# Patient Record
Sex: MALE | Race: WHITE | NOT HISPANIC OR LATINO | Employment: FULL TIME | URBAN - METROPOLITAN AREA
[De-identification: names, ages, dates, MRNs, and addresses within clinical notes are randomized per-mention and may not be internally consistent; named-entity substitution may affect disease eponyms.]

---

## 2019-10-07 ENCOUNTER — OFFICE VISIT (OUTPATIENT)
Dept: INTERNAL MEDICINE | Facility: CLINIC | Age: 39
End: 2019-10-07

## 2019-10-07 VITALS
TEMPERATURE: 98.2 F | OXYGEN SATURATION: 98 % | HEIGHT: 69 IN | DIASTOLIC BLOOD PRESSURE: 76 MMHG | SYSTOLIC BLOOD PRESSURE: 122 MMHG | WEIGHT: 177 LBS | BODY MASS INDEX: 26.22 KG/M2 | HEART RATE: 90 BPM

## 2019-10-07 DIAGNOSIS — Z13.220 NEED FOR LIPID SCREENING: ICD-10-CM

## 2019-10-07 DIAGNOSIS — Z23 ENCOUNTER FOR IMMUNIZATION: ICD-10-CM

## 2019-10-07 DIAGNOSIS — Z30.09 ENCOUNTER FOR COUNSELING REGARDING CONTRACEPTION: ICD-10-CM

## 2019-10-07 DIAGNOSIS — K64.9 HEMORRHOIDS, UNSPECIFIED HEMORRHOID TYPE: ICD-10-CM

## 2019-10-07 DIAGNOSIS — Z23 INFLUENZA VACCINE NEEDED: ICD-10-CM

## 2019-10-07 DIAGNOSIS — F17.200 SMOKES TOBACCO DAILY: ICD-10-CM

## 2019-10-07 DIAGNOSIS — R53.83 FATIGUE, UNSPECIFIED TYPE: Primary | ICD-10-CM

## 2019-10-07 PROCEDURE — 99204 OFFICE O/P NEW MOD 45 MIN: CPT | Performed by: FAMILY MEDICINE

## 2019-10-07 PROCEDURE — 90472 IMMUNIZATION ADMIN EACH ADD: CPT | Performed by: FAMILY MEDICINE

## 2019-10-07 PROCEDURE — 90471 IMMUNIZATION ADMIN: CPT | Performed by: FAMILY MEDICINE

## 2019-10-07 PROCEDURE — 90686 IIV4 VACC NO PRSV 0.5 ML IM: CPT | Performed by: FAMILY MEDICINE

## 2019-10-07 PROCEDURE — 90732 PPSV23 VACC 2 YRS+ SUBQ/IM: CPT | Performed by: FAMILY MEDICINE

## 2019-10-07 NOTE — PROGRESS NOTES
"Date of Encounter: 10/07/2019  Patient: Mahin Lomas,  1980    History of Presenting Illness  Chief complaint: Fatigue, establish care    Pleasant 31-year-old male with daily tobacco use, hemorrhoids, fatigue, presents to establish care with the above complaints.    Fatigue: Present \"since my first child\", feels like he is unable to work that being extremely tired.  Works as a , when he starts his shift at 2 PM he is exhausted.  Because of his parental duties he often only sleeps 5 hours per night.  He has had to take his wife stimulants in order to stay awake at work sometimes.  He does endorse irritability and limited patience sometimes towards his children.    Hemorrhoids: 1 hemorrhoid has flared approximately 3 times over the past 2 years, possibly thrombosed 1 week ago but now resolving.  Intermittent constipation \"because I like to eat cheese\".  Does not drink a lot of water throughout the day.    Tobacco use: Smokes approximately 10 cigarettes/day, started as a social thing at work but now started to habit.  Pre-contemplative.  Endorses some intermittent cough, but no shortness of breath or wheezing.    Vasectomy: Interested in vasectomy.  Would like to talk to urologist.    , 2 children ages 10 and 2 years old, with one on the way due in January.  Does not exercise.  No alcohol use.  Tobacco as above.  History of cannabis use.  Eats a lot of cheese, meats (works at a [employer]).  Does not have a living well.    Has not had annual influenza, has not had PPSV23.    Review of Systems:  Negative for fever, congestion, chest pain upon exertion, shortness of breath, vision changes, vomiting, dysuria, lymphadenopathy, muscle weakness, focal numbness  Positive for constipation, eczema, irritability.    The following portions of the patient's history were reviewed and updated as appropriate: allergies, current medications, past family history, past medical history, past social history, past " "surgical history and problem list.    Patient Active Problem List   Diagnosis   • Fatigue   • Hemorrhoids   • Smokes tobacco daily     Past Medical History:   Diagnosis Date   • Ear drum perforation     REPAIRED VIA SX   • Hearing problem      Past Surgical History:   Procedure Laterality Date   • INNER EAR SURGERY Left      Family History   Problem Relation Age of Onset   • Heart disease Mother    • COPD Mother    • Heart disease Father    • Hypertension Father    • Diabetes Father    • Heart disease Maternal Grandmother    • Heart disease Paternal Grandfather    • Hypertension Paternal Grandfather    • Cancer Paternal Grandfather      No current outpatient medications on file.  No Known Allergies  Social History     Tobacco Use   • Smoking status: Current Every Day Smoker     Packs/day: 0.25     Start date: 1999   • Smokeless tobacco: Never Used   Substance Use Topics   • Alcohol use: No     Frequency: Never   • Drug use: Not on file       Physical Exam  Vitals:    10/07/19 0757   BP: 122/76   Pulse: 90   Temp: 98.2 °F (36.8 °C)   SpO2: 98%   Weight: 80.3 kg (177 lb)   Height: 175.3 cm (69\")       Pertinent findings:  Ear: Left tympanic membrane with scarring particularly at the anterior aspect near where prior reported patches were performed.  Cannot visualize any defects.    Other findings:  Constitutional: NAD.  Eyes: EOMI, PERRLA, normal conjunctiva.  Nose, mouth, throat: No tonsillar exudates or erythema. Mild postnasal drip.  Cardiovascular: RRR, no murmurs, no LE edema b/l, Pulses 2+ in UE b/l.  Respiratory: CTA b/l. Good effort.  Skin: No large lesions or wounds on face or upper extremities.  Lymphatic: No anterior cervical lymphadenopathy.  Endocrine: No thyromegaly or palpable thyroid nodules.  Psychiatric: Normal affect.  Reports mood is \"tired\".  Normal thought content.  Normal speech pattern.    Assessment and Plan  Diagnoses and all orders for this visit:    1. Need for lipid screening (Primary)  -   "   Lipid Panel    2. Fatigue, unspecified type: Likely due to lack of sleep, but this may not be modifiable. Exam unremarkable. Will get blood work to evaluate for other causes of fatigue.  -     CBC & Differential  -     Comprehensive Metabolic Panel  -     Vitamin B12  -     Thyroid Panel With TSH    3. Smokes tobacco daily: Discussed tobacco cessation.  Patient pre-contemplative.    4. Influenza vaccine needed  -     Fluarix/Fluzone/Afluria Quad/FluLaval Quad    5. Hemorrhoids, unspecified hemorrhoid type: If patient continues to have recurrence would recommend referral to colorectal for removal.    6. Encounter for counseling regarding contraception: Referral to urology to discuss vasectomy.    7. Encounter for immunization  -     Pneumococcal Polysaccharide Vaccine 23-Valent (PPSV23) Greater Than or Equal To 3yo Subcutaneous / IM    Will have patient return in 6 months for follow-up of fatigue, smoking.  Close watch on mood.  Covered most preventive topics today, but I would like to we discussed exercise and diet in more depth so we can also make that our annual visit.      Edward Oreilly MD  Family Medicine  O: [phone]  C: [phone]    Disclaimer: Parts of this note were dictated by speech recognition. Minor errors in transcription may be present. Please call if questions.

## 2019-10-09 LAB
ALBUMIN SERPL-MCNC: 5 G/DL (ref 3.5–5.5)
ALBUMIN/GLOB SERPL: 2.3 {RATIO} (ref 1.2–2.2)
ALP SERPL-CCNC: 62 IU/L (ref 39–117)
ALT SERPL-CCNC: 35 IU/L (ref 0–44)
AST SERPL-CCNC: 30 IU/L (ref 0–40)
BASOPHILS # BLD AUTO: 0 X10E3/UL (ref 0–0.2)
BASOPHILS NFR BLD AUTO: 1 %
BILIRUB SERPL-MCNC: 0.4 MG/DL (ref 0–1.2)
BUN SERPL-MCNC: 17 MG/DL (ref 6–20)
BUN/CREAT SERPL: 18 (ref 9–20)
CALCIUM SERPL-MCNC: 8.9 MG/DL (ref 8.7–10.2)
CHLORIDE SERPL-SCNC: 102 MMOL/L (ref 96–106)
CHOLEST SERPL-MCNC: 170 MG/DL (ref 100–199)
CO2 SERPL-SCNC: 23 MMOL/L (ref 20–29)
CREAT SERPL-MCNC: 0.97 MG/DL (ref 0.76–1.27)
EOSINOPHIL # BLD AUTO: 0.1 X10E3/UL (ref 0–0.4)
EOSINOPHIL NFR BLD AUTO: 2 %
ERYTHROCYTE [DISTWIDTH] IN BLOOD BY AUTOMATED COUNT: 13.6 % (ref 12.3–15.4)
FT4I SERPL CALC-MCNC: 1.6 (ref 1.2–4.9)
GLOBULIN SER CALC-MCNC: 2.2 G/DL (ref 1.5–4.5)
GLUCOSE SERPL-MCNC: 103 MG/DL (ref 65–99)
HCT VFR BLD AUTO: 42.1 % (ref 37.5–51)
HDLC SERPL-MCNC: 38 MG/DL
HGB BLD-MCNC: 13.6 G/DL (ref 13–17.7)
IMM GRANULOCYTES # BLD AUTO: 0 X10E3/UL (ref 0–0.1)
IMM GRANULOCYTES NFR BLD AUTO: 0 %
LDLC SERPL CALC-MCNC: 98 MG/DL (ref 0–99)
LYMPHOCYTES # BLD AUTO: 1.6 X10E3/UL (ref 0.7–3.1)
LYMPHOCYTES NFR BLD AUTO: 36 %
MCH RBC QN AUTO: 28.5 PG (ref 26.6–33)
MCHC RBC AUTO-ENTMCNC: 32.3 G/DL (ref 31.5–35.7)
MCV RBC AUTO: 88 FL (ref 79–97)
MONOCYTES # BLD AUTO: 0.4 X10E3/UL (ref 0.1–0.9)
MONOCYTES NFR BLD AUTO: 8 %
NEUTROPHILS # BLD AUTO: 2.4 X10E3/UL (ref 1.4–7)
NEUTROPHILS NFR BLD AUTO: 53 %
PLATELET # BLD AUTO: 274 X10E3/UL (ref 150–450)
POTASSIUM SERPL-SCNC: 4.5 MMOL/L (ref 3.5–5.2)
PROT SERPL-MCNC: 7.2 G/DL (ref 6–8.5)
RBC # BLD AUTO: 4.78 X10E6/UL (ref 4.14–5.8)
SODIUM SERPL-SCNC: 141 MMOL/L (ref 134–144)
T3RU NFR SERPL: 25 % (ref 24–39)
T4 SERPL-MCNC: 6.3 UG/DL (ref 4.5–12)
TRIGL SERPL-MCNC: 170 MG/DL (ref 0–149)
TSH SERPL DL<=0.005 MIU/L-ACNC: 2 UIU/ML (ref 0.45–4.5)
VIT B12 SERPL-MCNC: 636 PG/ML (ref 232–1245)
VLDLC SERPL CALC-MCNC: 34 MG/DL (ref 5–40)
WBC # BLD AUTO: 4.5 X10E3/UL (ref 3.4–10.8)

## 2019-10-11 ENCOUNTER — TELEPHONE (OUTPATIENT)
Dept: INTERNAL MEDICINE | Facility: CLINIC | Age: 39
End: 2019-10-11

## 2019-10-11 NOTE — TELEPHONE ENCOUNTER
After several attempts to call patient without success, I left a voice mail discussing the results as previously agreed. I have no concerns at this time.

## 2019-10-11 NOTE — TELEPHONE ENCOUNTER
After several attempts to call patient without success, I left a voice mail discussing the results as previously agreed.

## 2020-05-28 ENCOUNTER — OFFICE VISIT (OUTPATIENT)
Dept: INTERNAL MEDICINE | Facility: CLINIC | Age: 40
End: 2020-05-28

## 2020-05-28 VITALS
DIASTOLIC BLOOD PRESSURE: 62 MMHG | WEIGHT: 186.2 LBS | HEART RATE: 75 BPM | SYSTOLIC BLOOD PRESSURE: 118 MMHG | BODY MASS INDEX: 27.5 KG/M2 | OXYGEN SATURATION: 98 %

## 2020-05-28 DIAGNOSIS — F17.200 SMOKES TOBACCO DAILY: ICD-10-CM

## 2020-05-28 DIAGNOSIS — Z00.00 ANNUAL PHYSICAL EXAM: Primary | ICD-10-CM

## 2020-05-28 DIAGNOSIS — Z23 NEED FOR TDAP VACCINATION: ICD-10-CM

## 2020-05-28 DIAGNOSIS — R53.83 FATIGUE, UNSPECIFIED TYPE: ICD-10-CM

## 2020-05-28 PROBLEM — K64.9 HEMORRHOIDS: Status: RESOLVED | Noted: 2019-10-07 | Resolved: 2020-05-28

## 2020-05-28 PROCEDURE — 90471 IMMUNIZATION ADMIN: CPT | Performed by: FAMILY MEDICINE

## 2020-05-28 PROCEDURE — 99395 PREV VISIT EST AGE 18-39: CPT | Performed by: FAMILY MEDICINE

## 2020-05-28 PROCEDURE — 90715 TDAP VACCINE 7 YRS/> IM: CPT | Performed by: FAMILY MEDICINE

## 2020-05-28 NOTE — PROGRESS NOTES
Date of Encounter: 2020  Patient: Mahin Lomas,  1980    Subjective   History of Presenting Illness  Chief complaint: Follow-up fatigue, annual physical    Interval events: Patient had a third child, then due to COVID was laid off from work.  Fortunately things are going better at home and his work resumes the second week of .  He has a vasectomy scheduled for next week.    Fatigue: Still getting interrupted sleep due to 5-month-old waking up at night.  However, he feels his irritability, fatigue, and patience have improved significantly.  Denies guilt and anhedonia.    Still smoking 4 to 5 cigarettes/day.  Plans to quit when he returns to work.  Has been eating more junk food.  Plans to resume healthier diet once returned to work.  Exercising twice per week. Diet needs Tdap.    Review of Systems:  Here for cough, congestion, fever, abdominal pain, headaches    The following portions of the patient's history were reviewed and updated as appropriate: allergies, current medications, past family history, past medical history, past social history, past surgical history and problem list.    Patient Active Problem List   Diagnosis   • Fatigue   • Smokes tobacco daily     Past Medical History:   Diagnosis Date   • Ear drum perforation     REPAIRED VIA SX   • Hearing problem    • Hemorrhoids 10/7/2019     Past Surgical History:   Procedure Laterality Date   • INNER EAR SURGERY Left      Family History   Problem Relation Age of Onset   • Heart disease Mother    • COPD Mother    • Heart disease Father    • Hypertension Father    • Diabetes Father    • Heart disease Maternal Grandmother    • Heart disease Paternal Grandfather    • Hypertension Paternal Grandfather    • Cancer Paternal Grandfather      No current outpatient medications on file.  No Known Allergies  Social History     Tobacco Use   • Smoking status: Current Every Day Smoker     Packs/day: 0.25     Start date:    • Smokeless tobacco: Never  Used   Substance Use Topics   • Alcohol use: No     Frequency: Never   • Drug use: Not on file          Objective   Physical Exam  Vitals:    05/28/20 1014   BP: 118/62   Pulse: 75   SpO2: 98%   Weight: 84.5 kg (186 lb 3.2 oz)     Body mass index is 27.5 kg/m².    Constitutional: NAD.  Eyes: EOMI. PERRLA. Normal conjunctiva.  Ear, nose, mouth, throat: No tonsillar exudates or erythema.   Normal nasal mucosa. Normal external ear canals and TMs bilaterally.  Cardiovascular: RRR. No murmurs. No LE edema b/l. Radial pulses 2+ bilaterally.  Pulmonary: CTA b/l. Good effort.  Integumentary: No rashes or wounds on face or upper extremities.  Lymphatic: No anterior cervical lymphadenopathy.  Endocrine: No thyromegaly or palpable thyroid nodules.  Psychiatric: Normal affect. Normal thought content.     Assessment/Plan   Assessment and Plan  Pleasant 31-year-old male light tobacco smoker with mild to moderate fatigue who presents with the following:    Diagnoses and all orders for this visit:    1. Annual physical exam (Primary):We discussed preventative care including age and patient-appropriate immunizations and cancer screening. We also discussed the importance of exercise and a healthy diet. This is their annual preventative exam..  In particular we discussed Tdap vaccination, healthy diet and exercise, monitoring of mood.    2. Fatigue, unspecified type: Improved from prior exam, continue to monitor.  Discussed sleep training options for child to help get better sleep as I think this is the main  of his fatigue.  Labs have been unremarkable.    3. Smokes tobacco daily: Discussed smoking cessation strategies, he plans to quit cold turkey at resumption of work    4. Need for Tdap vaccination    Return to office in 1 year for annual physical or earlier as needed.    Edward Oreilly MD  Family Medicine  O: [phone]  C: [phone]    Disclaimer: Parts of this note were dictated by speech recognition. Minor errors in  transcription may be present. Please call if questions.

## 2021-05-19 ENCOUNTER — IMMUNIZATION (OUTPATIENT)
Dept: VACCINE CLINIC | Facility: HOSPITAL | Age: 41
End: 2021-05-19

## 2021-05-19 PROCEDURE — 0001A: CPT | Performed by: OBSTETRICS & GYNECOLOGY

## 2021-05-19 PROCEDURE — 91300 HC SARSCOV02 VAC 30MCG/0.3ML IM: CPT | Performed by: OBSTETRICS & GYNECOLOGY

## 2021-06-03 ENCOUNTER — OFFICE VISIT (OUTPATIENT)
Dept: INTERNAL MEDICINE | Facility: CLINIC | Age: 41
End: 2021-06-03

## 2021-06-03 VITALS
HEIGHT: 69 IN | SYSTOLIC BLOOD PRESSURE: 132 MMHG | WEIGHT: 189.8 LBS | DIASTOLIC BLOOD PRESSURE: 88 MMHG | OXYGEN SATURATION: 99 % | BODY MASS INDEX: 28.11 KG/M2 | HEART RATE: 123 BPM

## 2021-06-03 DIAGNOSIS — F17.200 SMOKES TOBACCO DAILY: ICD-10-CM

## 2021-06-03 DIAGNOSIS — Z00.00 ANNUAL PHYSICAL EXAM: Primary | ICD-10-CM

## 2021-06-03 DIAGNOSIS — L30.9 ECZEMA, UNSPECIFIED TYPE: ICD-10-CM

## 2021-06-03 DIAGNOSIS — E78.5 HYPERLIPIDEMIA, UNSPECIFIED HYPERLIPIDEMIA TYPE: ICD-10-CM

## 2021-06-03 DIAGNOSIS — S16.1XXA REPETITIVE STRAIN INJURY OF NECK, INITIAL ENCOUNTER: ICD-10-CM
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
## 2021-06-03 PROBLEM — R53.83 FATIGUE: Status: RESOLVED | Noted: 2019-10-07 | Resolved: 2021-06-03

## 2021-06-03 PROCEDURE — 99395 PREV VISIT EST AGE 18-39: CPT | Performed by: FAMILY MEDICINE

## 2021-06-03 RX ORDER — PREDNISONE 10 MG/1
TABLET ORAL
Qty: 28 TABLET | Refills: 0 | Status: SHIPPED | OUTPATIENT
Start: 2021-06-03 | End: 2021-06-15

## 2021-06-03 RX ORDER — TRIAMCINOLONE ACETONIDE 0.25 MG/G
CREAM TOPICAL 2 TIMES DAILY PRN
Qty: 80 G | Refills: 3 | Status: SHIPPED | OUTPATIENT
Start: 2021-06-03 | End: 2022-08-16

## 2021-06-03 NOTE — PROGRESS NOTES
Date of Encounter: 2021  Patient: Mahin Lomas,  1980    Subjective   History of Presenting Illness  Chief complaint: Annual physical    Left-sided neck strain: Has had for approximately 2 years off and on, most recent flare was treated successfully with a corticosteroid shot and physical therapy.  Usually gets worse when he is holding his daughter a lot because he holds her in the left arm.  Has not been able to do regular stretches or exercises for this.  No radicular symptoms.    Smoking 5 to 10 cigarettes/day, contemplative but not ready to set a quit date yet.    Eczema, control with topical triamcinolone cream, needs refill.    Fatigue has resolved with improved sleep.    , 3 children. Does not exercise. No alcohol use. Still smoking 4 to 5 cigarettes/day. History of cannabis use.  Improving diet by pseudointermittent fasting, reducing red meat consumption. 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  Does not have a living will. Pending second COVID19 vaccination.    Review of Systems:  Negative for fever, congestion, chest pain upon exertion, shortness of breath, vision changes, vomiting, dysuria, lymphadenopathy, muscle weakness, numbness, mood changes    Positive for eczema    The following portions of the patient's history were reviewed and updated as appropriate: allergies, current medications, past family history, past medical history, past social history, past surgical history and problem list.    Patient Active Problem List   Diagnosis   • Smokes tobacco daily   • Hyperlipidemia   • Eczema   • Repetitive strain injury of neck     Past Medical History:   Diagnosis Date   • Ear drum perforation     REPAIRED VIA SX   • Fatigue 10/7/2019   • Hearing problem    • Hemorrhoids 10/7/2019     Past Surgical History:   Procedure Laterality Date   • INNER EAR SURGERY Left      Family History   Problem Relation Age of Onset   • Heart disease Mother    • COPD Mother    • Heart disease Father    •  "Hypertension Father    • Diabetes Father    • Heart disease Maternal Grandmother    • Heart disease Paternal Grandfather    • Hypertension Paternal Grandfather    • Cancer Paternal Grandfather        Current Outpatient Medications:   •  predniSONE (DELTASONE) 10 MG tablet, Take 4 tablets by mouth Daily for 4 days, THEN 2 tablets Daily for 4 days, THEN 1 tablet Daily for 4 days., Disp: 28 tablet, Rfl: 0  •  triamcinolone (KENALOG) 0.025 % cream, Apply  topically to the appropriate area as directed 2 (Two) Times a Day As Needed (eczema)., Disp: 80 g, Rfl: 3  No Known Allergies  Social History     Tobacco Use   • Smoking status: Current Every Day Smoker     Packs/day: 0.25     Start date: 1999   • Smokeless tobacco: Never Used   Substance Use Topics   • Alcohol use: No   • Drug use: Not on file          Objective   Physical Exam  Vitals:    06/03/21 0926   BP: 132/88   Pulse: (!) 123   SpO2: 99%   Weight: 86.1 kg (189 lb 12.8 oz)   Height: 175.3 cm (69\")     Body mass index is 28.03 kg/m².    Constitutional: NAD.  Eyes: EOMI. PERRLA. Normal conjunctiva.  Ear, nose, mouth, throat: No tonsillar exudates or erythema.   Normal nasal mucosa. Normal external ear canals and TMs bilaterally.  Cardiovascular: RRR. No murmurs. No LE edema b/l. Radial pulses 2+ bilaterally.  Pulmonary: CTA b/l. Good effort.  Integumentary: No rashes or wounds on face or upper extremities.  Lymphatic: No anterior cervical lymphadenopathy.  Endocrine: No thyromegaly or palpable thyroid nodules.  Psychiatric: Normal affect. Normal thought content.  Gastrointestinal: Nondistended. No hepatosplenomegaly. No focal tenderness to palpation. Normal bowel sounds.  Musculoskeletal: Muscle tightness along the left neck and upper trapezius muscles.  No tenderness to palpation of the cervical spine.  No weakness in the upper extremities.  No palpable masses.     Assessment/Plan   Assessment and Plan  Pleasant 40-year-old male with hyperlipidemia, eczema, daily " tobacco use, who presents with the following:    Diagnoses and all orders for this visit:    1. Annual physical exam (Primary): We discussed preventative care including age and patient-appropriate immunizations and cancer screening. We also discussed the importance of exercise and a healthy diet. This is their annual preventative exam.  Goals are to quit smoking and start regular exercise at home, try to get good quantity sleep    2. Hyperlipidemia, unspecified hyperlipidemia type  -     Thyroid Panel With TSH  -     Lipid Panel  -     Comprehensive Metabolic Panel    3. Eczema, unspecified type: Controlled  -     triamcinolone (KENALOG) 0.025 % cream; Apply  topically to the appropriate area as directed 2 (Two) Times a Day As Needed (eczema).  Dispense: 80 g; Refill: 3    4. Repetitive strain injury of neck, initial encounter: Corticosteroids to reduce symptoms from acute injury since this is worked well for him previously, will also refer to physical therapy  -     predniSONE (DELTASONE) 10 MG tablet; Take 4 tablets by mouth Daily for 4 days, THEN 2 tablets Daily for 4 days, THEN 1 tablet Daily for 4 days.  Dispense: 28 tablet; Refill: 0  -     Ambulatory Referral to Physical Therapy Evaluate and treat    5. Smokes tobacco daily: He is to let me know when he is interested in quitting and we can discuss medications    Return to office in 1 year for annual physical or earlier as needed.    Edward Oreilly MD  Family Medicine  O: [phone]  C: [phone]    Disclaimer: Parts of this note were dictated by speech recognition. Minor errors in transcription may be present. Please call if questions.

## 2021-06-03 NOTE — PROGRESS NOTES
Venipuncture performed in [facility] by KD with good hemostasis. Patient tolerated well. 6/3/2021 Samara THEODORE LPN.

## 2021-06-04 LAB
ALBUMIN SERPL-MCNC: 4.9 G/DL (ref 4–5)
ALBUMIN/GLOB SERPL: 2.1 {RATIO} (ref 1.2–2.2)
ALP SERPL-CCNC: 64 IU/L (ref 48–121)
ALT SERPL-CCNC: 19 IU/L (ref 0–44)
AST SERPL-CCNC: 21 IU/L (ref 0–40)
BILIRUB SERPL-MCNC: 0.3 MG/DL (ref 0–1.2)
BUN SERPL-MCNC: 14 MG/DL (ref 6–20)
BUN/CREAT SERPL: 13 (ref 9–20)
CALCIUM SERPL-MCNC: 9.5 MG/DL (ref 8.7–10.2)
CHLORIDE SERPL-SCNC: 103 MMOL/L (ref 96–106)
CHOLEST SERPL-MCNC: 192 MG/DL (ref 100–199)
CO2 SERPL-SCNC: 25 MMOL/L (ref 20–29)
CREAT SERPL-MCNC: 1.12 MG/DL (ref 0.76–1.27)
FT4I SERPL CALC-MCNC: 1.6 (ref 1.2–4.9)
GLOBULIN SER CALC-MCNC: 2.3 G/DL (ref 1.5–4.5)
GLUCOSE SERPL-MCNC: 97 MG/DL (ref 65–99)
HDLC SERPL-MCNC: 46 MG/DL
LDLC SERPL CALC-MCNC: 128 MG/DL (ref 0–99)
POTASSIUM SERPL-SCNC: 4.8 MMOL/L (ref 3.5–5.2)
PROT SERPL-MCNC: 7.2 G/DL (ref 6–8.5)
SODIUM SERPL-SCNC: 141 MMOL/L (ref 134–144)
T3RU NFR SERPL: 26 % (ref 24–39)
T4 SERPL-MCNC: 6.3 UG/DL (ref 4.5–12)
TRIGL SERPL-MCNC: 98 MG/DL (ref 0–149)
TSH SERPL DL<=0.005 MIU/L-ACNC: 0.77 UIU/ML (ref 0.45–4.5)
VLDLC SERPL CALC-MCNC: 18 MG/DL (ref 5–40)

## 2021-06-09 ENCOUNTER — IMMUNIZATION (OUTPATIENT)
Dept: VACCINE CLINIC | Facility: HOSPITAL | Age: 41
End: 2021-06-09

## 2021-06-09 PROCEDURE — 0002A: CPT | Performed by: OBSTETRICS & GYNECOLOGY

## 2021-06-09 PROCEDURE — 91300 HC SARSCOV02 VAC 30MCG/0.3ML IM: CPT | Performed by: OBSTETRICS & GYNECOLOGY

## 2021-12-02 ENCOUNTER — OFFICE VISIT (OUTPATIENT)
Dept: INTERNAL MEDICINE | Facility: CLINIC | Age: 41
End: 2021-12-02

## 2021-12-02 VITALS
HEIGHT: 69 IN | HEART RATE: 86 BPM | DIASTOLIC BLOOD PRESSURE: 74 MMHG | TEMPERATURE: 98.2 F | BODY MASS INDEX: 26.13 KG/M2 | OXYGEN SATURATION: 99 % | SYSTOLIC BLOOD PRESSURE: 116 MMHG | WEIGHT: 176.4 LBS

## 2021-12-02 DIAGNOSIS — S69.91XA INJURY OF RING FINGER, RIGHT, INITIAL ENCOUNTER: ICD-10-CM

## 2021-12-02 DIAGNOSIS — B07.8 OTHER VIRAL WARTS: Primary | ICD-10-CM

## 2021-12-02 PROCEDURE — 99213 OFFICE O/P EST LOW 20 MIN: CPT | Performed by: FAMILY MEDICINE

## 2021-12-02 PROCEDURE — 17110 DESTRUCTION B9 LES UP TO 14: CPT | Performed by: FAMILY MEDICINE

## 2021-12-02 NOTE — PROGRESS NOTES
"Date of Encounter: 2021  Patient: Mahin Lomas,  1980    Subjective   History of Presenting Illness  Chief complaint: Wart    Wart that has been present for years on the medial aspect of his left second toe. Often causes irritation of the great toe due to friction from the hyperkeratosis. He has pared it down occasionally on his own but otherwise not using any particular wart treatment.    Recent \"jamming\" injury to the right ring finger, no longer has pain in general but he has noticed a deviation in the middle phalanx and occasional discomfort when gripping things with the fingertip and pulling.    Review of Systems:  Negative for fever, cough, shortness of breath    The following portions of the patient's history were reviewed and updated as appropriate: allergies, current medications, past family history, past medical history, past social history, past surgical history and problem list.    Patient Active Problem List   Diagnosis   • Smokes tobacco daily   • Hyperlipidemia   • Eczema   • Repetitive strain injury of neck     Past Medical History:   Diagnosis Date   • Ear drum perforation     REPAIRED VIA SX   • Fatigue 10/7/2019   • Hearing problem    • Hemorrhoids 10/7/2019     Past Surgical History:   Procedure Laterality Date   • INNER EAR SURGERY Left      Family History   Problem Relation Age of Onset   • Heart disease Mother    • COPD Mother    • Heart disease Father    • Hypertension Father    • Diabetes Father    • Heart disease Maternal Grandmother    • Heart disease Paternal Grandfather    • Hypertension Paternal Grandfather    • Cancer Paternal Grandfather        Current Outpatient Medications:   •  triamcinolone (KENALOG) 0.025 % cream, Apply  topically to the appropriate area as directed 2 (Two) Times a Day As Needed (eczema)., Disp: 80 g, Rfl: 3  No Known Allergies  Social History     Tobacco Use   • Smoking status: Current Every Day Smoker     Packs/day: 0.25     Start date:    • " "Smokeless tobacco: Never Used   Substance Use Topics   • Alcohol use: No   • Drug use: Not on file          Objective   Physical Exam  Vitals:    12/02/21 1052   BP: 116/74   Pulse: 86   Temp: 98.2 °F (36.8 °C)   TempSrc: Temporal   SpO2: 99%   Weight: 80 kg (176 lb 6.4 oz)   Height: 175.3 cm (69\")     Body mass index is 26.05 kg/m².    Constitutional: NAD.  Integumentary: 7 mm hyperkeratotic lesion with heaped outer edges and verrucous appearance of the central area. No ulceration.  Musculoskeletal: Radial deviation of the middle phalanx of the fourth digit of the right hand. Range of motion and strength is normal in the finger including at the DIP, PIP, and MCP. There is no tenderness to palpation.       Assessment/Plan   Assessment and Plan  Pleasant 41-year-old male with hyperlipidemia, eczema, daily tobacco use, who presents with the following:    Diagnoses and all orders for this visit:    1. Other viral warts (Primary): Discussed the risks and benefits of paring with cryotherapy. Using a #10 blade, after application of alcohol for antiseptic, remove the keratotic debris without any discomfort to the patient. There was no bleeding. After this I used liquid nitrogen applied by cotton swab for 4 cycles of frosting of the lesion. Discussed appropriate wound care and expectations. May need to return for additional treatment, also discussed over-the-counter salicylic acid treatment once healed.    2. Injury of ring finger, right, initial encounter: Examination consistent with prior fracture that appears to be completely healed. He does not have any significant pain or strength/range of motion limitations, since intervention would have to be surgical I only recommend consideration if he starts to have problems with this finger.    Edward Oreilly MD  Family Medicine  O: [phone]  C: [phone]    Disclaimer: Parts of this note were dictated by speech recognition. Minor errors in transcription may be present. " Please call if questions.

## 2022-03-11 ENCOUNTER — OFFICE VISIT (OUTPATIENT)
Dept: INTERNAL MEDICINE | Facility: CLINIC | Age: 42
End: 2022-03-11

## 2022-03-11 ENCOUNTER — PATIENT MESSAGE (OUTPATIENT)
Dept: INTERNAL MEDICINE | Facility: CLINIC | Age: 42
End: 2022-03-11

## 2022-03-11 VITALS
SYSTOLIC BLOOD PRESSURE: 118 MMHG | WEIGHT: 170 LBS | OXYGEN SATURATION: 99 % | BODY MASS INDEX: 25.18 KG/M2 | HEIGHT: 69 IN | DIASTOLIC BLOOD PRESSURE: 72 MMHG | TEMPERATURE: 98 F | HEART RATE: 95 BPM

## 2022-03-11 DIAGNOSIS — R09.81 NASAL CONGESTION: ICD-10-CM

## 2022-03-11 DIAGNOSIS — H65.91 RIGHT OTITIS MEDIA WITH EFFUSION: ICD-10-CM

## 2022-03-11 DIAGNOSIS — Z20.822 EXPOSURE TO COVID-19 VIRUS: Primary | ICD-10-CM

## 2022-03-11 DIAGNOSIS — F17.200 SMOKES TOBACCO DAILY: ICD-10-CM

## 2022-03-11 DIAGNOSIS — R05.9 COUGH: ICD-10-CM

## 2022-03-11 LAB
EXPIRATION DATE: NORMAL
FLUAV AG UPPER RESP QL IA.RAPID: NOT DETECTED
FLUBV AG UPPER RESP QL IA.RAPID: NOT DETECTED
INTERNAL CONTROL: NORMAL
Lab: NORMAL
SARS-COV-2 AG UPPER RESP QL IA.RAPID: NOT DETECTED

## 2022-03-11 PROCEDURE — 87428 SARSCOV & INF VIR A&B AG IA: CPT | Performed by: NURSE PRACTITIONER

## 2022-03-11 PROCEDURE — 99214 OFFICE O/P EST MOD 30 MIN: CPT | Performed by: NURSE PRACTITIONER

## 2022-03-11 RX ORDER — CEFDINIR 300 MG/1
300 CAPSULE ORAL 2 TIMES DAILY
Qty: 14 CAPSULE | Refills: 0 | Status: SHIPPED | OUTPATIENT
Start: 2022-03-11 | End: 2022-03-18

## 2022-03-11 NOTE — PROGRESS NOTES
Date of Encounter: 2022  Patient: Mahin Lomas,  1980    Subjective     Chief complaint: Exposure to Covid, cough and congestion.    HPI   Patient here today for sick visit.  Patient states his children and wife have been sick at home.  Patient has felt bad for the last 2 days.  Patient complains of cough, congestion and ear pain.  Patient states that he has a feeling of fullness in both ears.  Patient is continuing to smoke but states he has cut back some since feeling bad.  Patient has been taking Mucinex over-the-counter which she states has only helped some.  Patient denies any fever, nausea or vomiting.      Patient states he thought that it may have been allergies and started taking Claritin.  States that he has not noticed any difference with taking the Claritin.      Review of Systems:  Negative for fever, chest pain upon exertion, shortness of breath, vision changes, vomiting, dysuria, lymphadenopathy, muscle weakness, numbness, mood changes, rashes.  Positive for ear fullness, cough, congestion.    The following portions of the patient's history were reviewed and updated as appropriate: allergies, current medications, past family history, past medical history, past social history, past surgical history and problem list.    Patient Active Problem List   Diagnosis   • Smokes tobacco daily   • Hyperlipidemia   • Eczema   • Repetitive strain injury of neck     Past Medical History:   Diagnosis Date   • Ear drum perforation     REPAIRED VIA SX   • Fatigue 10/7/2019   • Hearing problem    • Hemorrhoids 10/7/2019     Past Surgical History:   Procedure Laterality Date   • INNER EAR SURGERY Left      Family History   Problem Relation Age of Onset   • Heart disease Mother    • COPD Mother    • Heart disease Father    • Hypertension Father    • Diabetes Father    • Heart disease Maternal Grandmother    • Heart disease Paternal Grandfather    • Hypertension Paternal Grandfather    • Cancer Paternal  "Grandfather        Current Outpatient Medications:   •  triamcinolone (KENALOG) 0.025 % cream, Apply  topically to the appropriate area as directed 2 (Two) Times a Day As Needed (eczema)., Disp: 80 g, Rfl: 3  •  cefdinir (OMNICEF) 300 MG capsule, Take 1 capsule by mouth 2 (Two) Times a Day for 7 days., Disp: 14 capsule, Rfl: 0  No Known Allergies  Social History     Tobacco Use   • Smoking status: Current Every Day Smoker     Packs/day: 0.25     Start date: 1999   • Smokeless tobacco: Never Used   Substance Use Topics   • Alcohol use: No          Objective   Physical Exam   Vitals:    03/11/22 1042   BP: 118/72   Pulse: 95   Temp: 98 °F (36.7 °C)   TempSrc: Oral   SpO2: 99%   Weight: 77.1 kg (170 lb)   Height: 175.3 cm (69\")     Body mass index is 25.1 kg/m².    Constitutional: NAD.  Eyes: EOMI. PERRLA. Normal conjunctiva.  Ear, nose, mouth, throat: No tonsillar exudates or erythema.   Normal nasal mucosa. Normal external ear canals left TM is red, right TM is red, bulging with effusion.  Cardiovascular: RRR. No murmurs.   Pulmonary: CTA b/l. Good effort.  Lymphatic: No anterior cervical lymphadenopathy.           Assessment/Plan   Assessment and Plan  Pleasant 41-year-old male with hyperlipidemia, eczema and others here today for sick visit.    Diagnoses and all orders for this visit:    1. Exposure to COVID-19 virus (Primary)  -     POCT SARS-CoV-2 Antigen CASEY + Flu    2. Nasal congestion  -     POCT SARS-CoV-2 Antigen CASEY + Flu    3. Cough  -     POCT SARS-CoV-2 Antigen CASEY + Flu    4. Right otitis media with effusion  -     cefdinir (OMNICEF) 300 MG capsule; Take 1 capsule by mouth 2 (Two) Times a Day for 7 days.  Dispense: 14 capsule; Refill: 0.  -Discussed about medications.  Answered some questions from the patient.    5. Smokes tobacco daily  Assessment & Plan:  -Encourage cessation.  Patient not ready to stop smoke at this time.         Patient verbalized understanding of and agreed to plan of " care.    Return if symptoms worsen or fail to improve.     Kallie Cunningham DNP, FNP-C  [facility]  O: [phone]      Please note that portions of this note were completed with a voice recognition program. Please call if questions.

## 2022-03-11 NOTE — TELEPHONE ENCOUNTER
From: Mahin Lomas  To: MARC Delaney  Sent: 3/11/2022 11:21 AM EST  Subject: Covid test     Hello, I was just in there for an ear infection and I got a Covid test. My employer needs a screenshot of the test if negative or positive.

## 2022-07-14 ENCOUNTER — TELEPHONE (OUTPATIENT)
Dept: INTERNAL MEDICINE | Facility: CLINIC | Age: 42
End: 2022-07-14

## 2022-07-14 NOTE — TELEPHONE ENCOUNTER
lvm for pt to call to schedule an office visit for restless legs before his physical in august.    Hub to read

## 2022-08-16 ENCOUNTER — OFFICE VISIT (OUTPATIENT)
Dept: INTERNAL MEDICINE | Facility: CLINIC | Age: 42
End: 2022-08-16

## 2022-08-16 VITALS
TEMPERATURE: 97.8 F | HEART RATE: 102 BPM | WEIGHT: 177.2 LBS | OXYGEN SATURATION: 100 % | BODY MASS INDEX: 26.17 KG/M2 | DIASTOLIC BLOOD PRESSURE: 86 MMHG | SYSTOLIC BLOOD PRESSURE: 124 MMHG

## 2022-08-16 DIAGNOSIS — F17.200 SMOKES TOBACCO DAILY: ICD-10-CM

## 2022-08-16 DIAGNOSIS — Z13.1 SCREENING FOR DIABETES MELLITUS: ICD-10-CM

## 2022-08-16 DIAGNOSIS — E78.5 HYPERLIPIDEMIA, UNSPECIFIED HYPERLIPIDEMIA TYPE: ICD-10-CM

## 2022-08-16 DIAGNOSIS — Z00.00 ANNUAL PHYSICAL EXAM: Primary | ICD-10-CM

## 2022-08-16 DIAGNOSIS — B07.8 OTHER VIRAL WARTS: ICD-10-CM

## 2022-08-16 DIAGNOSIS — L30.9 ECZEMA, UNSPECIFIED TYPE: ICD-10-CM

## 2022-08-16 DIAGNOSIS — Z13.89 SCREENING FOR BLOOD OR PROTEIN IN URINE: ICD-10-CM

## 2022-08-16 PROCEDURE — 99396 PREV VISIT EST AGE 40-64: CPT | Performed by: FAMILY MEDICINE

## 2022-08-16 RX ORDER — TRIAMCINOLONE ACETONIDE 1 MG/G
1 CREAM TOPICAL 2 TIMES DAILY
Qty: 80 G | Refills: 3 | Status: SHIPPED | OUTPATIENT
Start: 2022-08-16

## 2022-08-16 RX ORDER — MULTIPLE VITAMINS W/ MINERALS TAB 9MG-400MCG
1 TAB ORAL DAILY
COMMUNITY

## 2022-08-16 RX ORDER — IMIQUIMOD 12.5 MG/.25G
1 CREAM TOPICAL 2 TIMES WEEKLY
Qty: 24 EACH | Refills: 3 | Status: SHIPPED | OUTPATIENT
Start: 2022-08-18 | End: 2022-11-17

## 2022-08-16 NOTE — PROGRESS NOTES
Date of Encounter: 2022  Patient: Mahin Lomas,  1980    Subjective   History of Presenting Illness  Chief complaint: Annual physical    No acute concerns.    Viral warts of the foot, they recurred after cryotherapy treatment and have also spread to adjacent toes.  He is not using any topical therapy.    Eczema, does better with triamcinolone 0.1% instead of 0.025%.  He would like a refill for increased dose.    Left-sided neck strain has resolved with no current symptoms.    Pancreatic cancer in his maternal grandfather, paternal first cousin.  He does not know any further details about this pancreatic cancer but cousin is living and he can get these details.    , 3 children. Smoking 1/2 pack per day since age 17 yo, not currently interested in cessation.  Hopes to quit once his youngest kid enters school.  Does not use alcohol.  History of cannabis use.   Does not exercise.  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  Does not have a living will.   Discussed COVID-19 booster.    Review of Systems:  Negative for fever, congestion, chest pain upon exertion, shortness of breath, vision changes, vomiting, dysuria, lymphadenopathy, muscle weakness, numbness, mood changes, rashes.    The following portions of the patient's history were reviewed and updated as appropriate: allergies, current medications, past family history, past medical history, past social history, past surgical history and problem list.    Patient Active Problem List   Diagnosis   • Smokes tobacco daily   • Hyperlipidemia   • Eczema   • Repetitive strain injury of neck     Past Medical History:   Diagnosis Date   • Ear drum perforation     REPAIRED VIA SX   • Fatigue 10/7/2019   • Hearing problem    • Hemorrhoids 10/7/2019     Past Surgical History:   Procedure Laterality Date   • INNER EAR SURGERY Left      Family History   Problem  Relation Age of Onset   • Heart disease Mother    • COPD Mother    • Heart disease Father    • Hypertension Father    • Diabetes Father    • Heart disease Maternal Grandmother    • Pancreatic cancer Maternal Grandfather    • Heart disease Paternal Grandfather    • Hypertension Paternal Grandfather    • Pancreatic cancer Cousin        Current Outpatient Medications:   •  multivitamin with minerals tablet tablet, Take 1 tablet by mouth Daily., Disp: , Rfl:   •  [START ON 8/18/2022] imiquimod (Aldara) 5 % cream, Apply 1 application topically to the appropriate area as directed 2 (Two) Times a Week., Disp: 24 each, Rfl: 3  •  salicylic acid-lactic acid 17 % external solution, Apply  topically to the appropriate area as directed Daily., Disp: 14 mL, Rfl: 1  •  triamcinolone (KENALOG) 0.1 % cream, Apply 1 application topically to the appropriate area as directed 2 (Two) Times a Day., Disp: 80 g, Rfl: 3  No Known Allergies  Social History     Tobacco Use   • Smoking status: Current Every Day Smoker     Packs/day: 0.25     Start date: 1999   • Smokeless tobacco: Never Used   Substance Use Topics   • Alcohol use: No          Objective   Physical Exam  Vitals:    08/16/22 1235   BP: 124/86   Pulse: 102   Temp: 97.8 °F (36.6 °C)   TempSrc: Infrared   SpO2: 100%   Weight: 80.4 kg (177 lb 3.2 oz)     Body mass index is 26.17 kg/m².    Constitutional: NAD.  Eyes: EOMI. PERRLA. Normal conjunctiva.  Ear, nose, mouth, throat: No tonsillar exudates or erythema.   Normal nasal mucosa. Normal external ear canals and TMs bilaterally.  Cardiovascular: RRR. No murmurs. No LE edema b/l. Radial pulses 2+ bilaterally.  Pulmonary: CTA b/l. Good effort.  Integumentary: No rashes or wounds on face or upper extremities.  Lymphatic: No anterior cervical lymphadenopathy.  Endocrine: No thyromegaly or palpable thyroid nodules.  Psychiatric: Normal affect. Normal thought content.  Gastrointestinal: Nondistended. No hepatosplenomegaly. No focal  tenderness to palpation. Normal bowel sounds.       Assessment & Plan   Assessment and Plan  Pleasant 42-year-old male with hyperlipidemia, eczema, daily tobacco use, who presents with the following:    Diagnoses and all orders for this visit:    1. Annual physical exam (Primary): We discussed preventative care including age and patient-appropriate immunizations and cancer screening. We also discussed the importance of exercise and a healthy diet. This is their annual preventative exam.    2. Other viral warts  -     imiquimod (Aldara) 5 % cream; Apply 1 application topically to the appropriate area as directed 2 (Two) Times a Week.  Dispense: 24 each; Refill: 3  -     salicylic acid-lactic acid 17 % external solution; Apply  topically to the appropriate area as directed Daily.  Dispense: 14 mL; Refill: 1    3. Eczema, unspecified type  -     triamcinolone (KENALOG) 0.1 % cream; Apply 1 application topically to the appropriate area as directed 2 (Two) Times a Day.  Dispense: 80 g; Refill: 3    4. Hyperlipidemia, unspecified hyperlipidemia type:   The 10-year ASCVD risk score ([place] PONCHO Jr., et al., 2013) is: 3.9%    Values used to calculate the score:      Age: 42 years      Sex: Male      Is Non- : No      Diabetic: No      Tobacco smoker: Yes      Systolic Blood Pressure: 124 mmHg      Is BP treated: No      HDL Cholesterol: 46 mg/dL      Total Cholesterol: 192 mg/dL  -     Comprehensive Metabolic Panel  -     CBC & Differential  -     Lipid Panel    5. Smokes tobacco daily: Discussed smoking cessation which he is precontemplative about at this time.    6. Screening for blood or protein in urine  -     Urinalysis With Microscopic - Urine, Clean Catch    7. Screening for diabetes mellitus  -     Hemoglobin A1c    Return to office in 1 year for annual physical or earlier as needed.    Edward Oreilly MD  Family Medicine  O: [phone]  C: [phone]    Disclaimer: Parts of this note were  dictated by speech recognition. Minor errors in transcription may be present. Please call if questions.

## 2022-08-16 NOTE — PATIENT INSTRUCTIONS
Have your cousin ask his oncologist:  1. Should any of my family members get screened for pancreatic cancer?  2. Are there any genetic problems that my family needs to know about that are related to my pancreatic cancer?

## 2022-08-17 PROBLEM — R73.03 PREDIABETES: Status: ACTIVE | Noted: 2022-08-17

## 2022-08-17 LAB
ALBUMIN SERPL-MCNC: 4.6 G/DL (ref 4–5)
ALBUMIN/GLOB SERPL: 2.4 {RATIO} (ref 1.2–2.2)
ALP SERPL-CCNC: 58 IU/L (ref 44–121)
ALT SERPL-CCNC: 15 IU/L (ref 0–44)
APPEARANCE UR: CLEAR
AST SERPL-CCNC: 14 IU/L (ref 0–40)
BACTERIA #/AREA URNS HPF: NORMAL /[HPF]
BASOPHILS # BLD AUTO: 0.1 X10E3/UL (ref 0–0.2)
BASOPHILS NFR BLD AUTO: 1 %
BILIRUB SERPL-MCNC: <0.2 MG/DL (ref 0–1.2)
BILIRUB UR QL STRIP: NEGATIVE
BUN SERPL-MCNC: 12 MG/DL (ref 6–24)
BUN/CREAT SERPL: 13 (ref 9–20)
CALCIUM SERPL-MCNC: 9.3 MG/DL (ref 8.7–10.2)
CASTS URNS QL MICRO: NORMAL /LPF
CHLORIDE SERPL-SCNC: 107 MMOL/L (ref 96–106)
CHOLEST SERPL-MCNC: 183 MG/DL (ref 100–199)
CO2 SERPL-SCNC: 24 MMOL/L (ref 20–29)
COLOR UR: YELLOW
CREAT SERPL-MCNC: 0.95 MG/DL (ref 0.76–1.27)
EGFRCR-CYS SERPLBLD CKD-EPI 2021: 103 ML/MIN/1.73
EOSINOPHIL # BLD AUTO: 0.1 X10E3/UL (ref 0–0.4)
EOSINOPHIL NFR BLD AUTO: 3 %
EPI CELLS #/AREA URNS HPF: NORMAL /HPF (ref 0–10)
ERYTHROCYTE [DISTWIDTH] IN BLOOD BY AUTOMATED COUNT: 13.6 % (ref 11.6–15.4)
GLOBULIN SER CALC-MCNC: 1.9 G/DL (ref 1.5–4.5)
GLUCOSE SERPL-MCNC: 79 MG/DL (ref 65–99)
GLUCOSE UR QL STRIP: NEGATIVE
HBA1C MFR BLD: 5.9 % (ref 4.8–5.6)
HCT VFR BLD AUTO: 39.7 % (ref 37.5–51)
HDLC SERPL-MCNC: 42 MG/DL
HGB BLD-MCNC: 12.9 G/DL (ref 13–17.7)
HGB UR QL STRIP: NEGATIVE
IMM GRANULOCYTES # BLD AUTO: 0 X10E3/UL (ref 0–0.1)
IMM GRANULOCYTES NFR BLD AUTO: 0 %
KETONES UR QL STRIP: NEGATIVE
LDLC SERPL CALC-MCNC: 116 MG/DL (ref 0–99)
LEUKOCYTE ESTERASE UR QL STRIP: NEGATIVE
LYMPHOCYTES # BLD AUTO: 1.3 X10E3/UL (ref 0.7–3.1)
LYMPHOCYTES NFR BLD AUTO: 25 %
MCH RBC QN AUTO: 27.9 PG (ref 26.6–33)
MCHC RBC AUTO-ENTMCNC: 32.5 G/DL (ref 31.5–35.7)
MCV RBC AUTO: 86 FL (ref 79–97)
MICRO URNS: NORMAL
MICRO URNS: NORMAL
MONOCYTES # BLD AUTO: 0.3 X10E3/UL (ref 0.1–0.9)
MONOCYTES NFR BLD AUTO: 7 %
NEUTROPHILS # BLD AUTO: 3.2 X10E3/UL (ref 1.4–7)
NEUTROPHILS NFR BLD AUTO: 64 %
NITRITE UR QL STRIP: NEGATIVE
PH UR STRIP: 6.5 [PH] (ref 5–7.5)
PLATELET # BLD AUTO: 222 X10E3/UL (ref 150–450)
POTASSIUM SERPL-SCNC: 4 MMOL/L (ref 3.5–5.2)
PROT SERPL-MCNC: 6.5 G/DL (ref 6–8.5)
PROT UR QL STRIP: NEGATIVE
RBC # BLD AUTO: 4.63 X10E6/UL (ref 4.14–5.8)
RBC #/AREA URNS HPF: NORMAL /HPF (ref 0–2)
SODIUM SERPL-SCNC: 143 MMOL/L (ref 134–144)
SP GR UR STRIP: 1.02 (ref 1–1.03)
TRIGL SERPL-MCNC: 137 MG/DL (ref 0–149)
UROBILINOGEN UR STRIP-MCNC: 0.2 MG/DL (ref 0.2–1)
VLDLC SERPL CALC-MCNC: 25 MG/DL (ref 5–40)
WBC # BLD AUTO: 5 X10E3/UL (ref 3.4–10.8)
WBC #/AREA URNS HPF: NORMAL /HPF (ref 0–5)

## 2022-08-17 NOTE — PROGRESS NOTES
The main finding in your blood work is that you are considered prediabetic.  Please be mindful of sugars and carbohydrates in your diet.  Regular exercise and a 10 pound weight loss would likely resolve this problem completely.  I do recommend making an appointment to see me in about 6 months to recheck this.  If it does not improve we may consider medications.    There are some minor nonsignificant abnormalities in the rest of your blood work that I am not concerned about.

## 2022-09-18 ENCOUNTER — TELEPHONE (OUTPATIENT)
Dept: FAMILY MEDICINE CLINIC | Facility: CLINIC | Age: 42
End: 2022-09-18

## 2022-09-18 DIAGNOSIS — H65.193 OTHER NON-RECURRENT ACUTE NONSUPPURATIVE OTITIS MEDIA OF BOTH EARS: Primary | ICD-10-CM

## 2022-09-18 RX ORDER — AMOXICILLIN 875 MG/1
875 TABLET, COATED ORAL 2 TIMES DAILY
Qty: 20 TABLET | Refills: 0 | Status: SHIPPED | OUTPATIENT
Start: 2022-09-18 | End: 2022-09-28

## 2022-09-18 NOTE — TELEPHONE ENCOUNTER
I spoke with Mahin on 9/18/2022 at 1020 am.  Woke up this morning around 4 AM with severe pain.  States he was outside yesterday doing yard work and around a bonfire.  States he has had ear infections in the past.  Describes the pain as a pressure feeling.  Denied any headache, fevers, chills or rhinorrhea.    I will send to pharmacy amoxicillin to take twice daily.  He was instructed if symptoms do not improve, he will schedule an office appointment with Dr. Oreilly.

## 2022-10-03 ENCOUNTER — OFFICE VISIT (OUTPATIENT)
Dept: INTERNAL MEDICINE | Facility: CLINIC | Age: 42
End: 2022-10-03

## 2022-10-03 VITALS
DIASTOLIC BLOOD PRESSURE: 96 MMHG | WEIGHT: 174 LBS | SYSTOLIC BLOOD PRESSURE: 134 MMHG | TEMPERATURE: 98.6 F | BODY MASS INDEX: 25.77 KG/M2 | OXYGEN SATURATION: 97 % | HEIGHT: 69 IN | HEART RATE: 75 BPM

## 2022-10-03 DIAGNOSIS — R55 VASOVAGAL SYNCOPE: Primary | ICD-10-CM

## 2022-10-03 DIAGNOSIS — E78.5 HYPERLIPIDEMIA, UNSPECIFIED HYPERLIPIDEMIA TYPE: ICD-10-CM

## 2022-10-03 DIAGNOSIS — R73.03 PREDIABETES: ICD-10-CM

## 2022-10-03 PROCEDURE — 99214 OFFICE O/P EST MOD 30 MIN: CPT | Performed by: NURSE PRACTITIONER

## 2022-10-03 NOTE — PROGRESS NOTES
Date of Encounter: 10/03/2022  Patient: Mahin Lomas,  1980    Subjective     Chief complaint: Syncope    HPI   Patient here today after syncope at home last night.  Patient was evaluated by EMS and states that his blood sugar and EKG were normal.  Patient has his wife here today and they have some questions about best next steps in care.    Patient states that just before passing out he was feeling like an upset stomach.  Patient states he was constipated and trying to have a bowel movement.  Patient states that he ended up having a bowel movement later that evening that was followed by some diarrhea.  Patient states he then felt much better.  Patient states that he is gone about his day today like normal.  Feels fine.      Review of Systems:  Negative for fever, congestion, chest pain upon exertion, shortness of breath, vision changes, vomiting, dysuria, lymphadenopathy, muscle weakness, numbness, mood changes, rashes.    The following portions of the patient's history were reviewed and updated as appropriate: allergies, current medications, past family history, past medical history, past social history, past surgical history and problem list.    Patient Active Problem List   Diagnosis   • Smokes tobacco daily   • Hyperlipidemia   • Eczema   • Repetitive strain injury of neck   • Prediabetes     Past Medical History:   Diagnosis Date   • Ear drum perforation     REPAIRED VIA SX   • Fatigue 10/7/2019   • Hearing problem    • Hemorrhoids 10/7/2019     Past Surgical History:   Procedure Laterality Date   • INNER EAR SURGERY Left      Family History   Problem Relation Age of Onset   • Heart disease Mother    • COPD Mother    • Heart disease Father    • Hypertension Father    • Diabetes Father    • Heart disease Maternal Grandmother    • Pancreatic cancer Maternal Grandfather    • Heart disease Paternal Grandfather    • Hypertension Paternal Grandfather    • Pancreatic cancer Cousin        Current Outpatient  "Medications:   •  imiquimod (Aldara) 5 % cream, Apply 1 application topically to the appropriate area as directed 2 (Two) Times a Week., Disp: 24 each, Rfl: 3  •  multivitamin with minerals tablet tablet, Take 1 tablet by mouth Daily., Disp: , Rfl:   •  salicylic acid-lactic acid 17 % external solution, Apply  topically to the appropriate area as directed Daily., Disp: 14 mL, Rfl: 1  •  triamcinolone (KENALOG) 0.1 % cream, Apply 1 application topically to the appropriate area as directed 2 (Two) Times a Day., Disp: 80 g, Rfl: 3  No Known Allergies  Social History     Tobacco Use   • Smoking status: Current Every Day Smoker     Packs/day: 0.25     Start date: 1999   • Smokeless tobacco: Never Used   Substance Use Topics   • Alcohol use: No          Objective   Physical Exam   Vitals:    10/03/22 1338   BP: 134/96   Pulse: 75   Temp: 98.6 °F (37 °C)   TempSrc: Temporal   SpO2: 97%   Weight: 78.9 kg (174 lb)   Height: 175.3 cm (69\")     Body mass index is 25.7 kg/m².    Constitutional: NAD.  Eyes: EOMI. PERRLA. Normal conjunctiva.  Cardiovascular: RRR. No murmurs. No LE edema b/l. Radial pulses 2+ bilaterally.  Pulmonary: CTA b/l. Good effort.  Integumentary: No rashes or wounds on face or upper extremities.  GI: Abdomen soft and flat  Psychiatric: Normal affect. Normal thought content.           Assessment & Plan   Assessment and Plan  Pleasant 42-year-old male with hyperlipidemia, prediabetes, eczema and others here today to discuss the following:    Diagnoses and all orders for this visit:    1. Vasovagal syncope (Primary)   Reviewed history and discussed event.  Answered some questions from the patient and his wife.  Discussed about constipation prevention.    2. Hyperlipidemia, unspecified hyperlipidemia type  Assessment & Plan:  Reviewed previous labs with patient and wife at bedside.  Discussed about diet as well as behavior modifications.      3. Prediabetes  Assessment & Plan:  Reviewed previous labs with " patient and wife at bedside.  Discussed about diet and exercise.  Recommended patient make a goal to eliminate soda pop from his diet.  Gave patient educational handout attached to AVS.         Answered many questions from wife and patient regarding overall health and best next steps in care.  Patient to make behavioral modifications and diet and exercise routine.  Patient verbalized understanding of and agreed to plan of care.  Return in about 6 weeks (around 11/14/2022) for Fasting Labs, Follow Up.     Kallie Cunningham DNP, FNP-C  [facility]  O: [phone]      Please note that portions of this note were completed with a voice recognition program. Please call if questions.

## 2022-10-03 NOTE — ASSESSMENT & PLAN NOTE
Reviewed previous labs with patient and wife at bedside.  Discussed about diet and exercise.  Recommended patient make a goal to eliminate soda pop from his diet.  Gave patient educational handout attached to AVS.

## 2022-10-03 NOTE — ASSESSMENT & PLAN NOTE
Reviewed previous labs with patient and wife at bedside.  Discussed about diet as well as behavior modifications.

## 2022-10-03 NOTE — PATIENT INSTRUCTIONS
Prediabetes Eating Plan  Prediabetes is a condition that causes blood sugar (glucose) levels to be higher than normal. This increases the risk for developing type 2 diabetes (type 2 diabetes mellitus). Working with a health care provider or nutrition specialist (dietitian) to make diet and lifestyle changes can help prevent the onset of diabetes. These changes may help you:  Control your blood glucose levels.  Improve your cholesterol levels.  Manage your blood pressure.  What are tips for following this plan?  Reading food labels  Read food labels to check the amount of fat, salt (sodium), and sugar in prepackaged foods. Avoid foods that have:  Saturated fats.  Trans fats.  Added sugars.  Avoid foods that have more than 300 milligrams (mg) of sodium per serving. Limit your sodium intake to less than 2,300 mg each day.  Shopping  Avoid buying pre-made and processed foods.  Avoid buying drinks with added sugar.  Cooking  Cook with olive oil. Do not use butter, lard, or ghee.  Bake, broil, grill, steam, or boil foods. Avoid frying.  Meal planning    Work with your dietitian to create an eating plan that is right for you. This may include tracking how many calories you take in each day. Use a food diary, notebook, or mobile application to track what you eat at each meal.  Consider following a Mediterranean diet. This includes:  Eating several servings of fresh fruits and vegetables each day.  Eating fish at least twice a week.  Eating one serving each day of whole grains, beans, nuts, and seeds.  Using olive oil instead of other fats.  Limiting alcohol.  Limiting red meat.  Using nonfat or low-fat dairy products.  Consider following a plant-based diet. This includes dietary choices that focus on eating mostly vegetables and fruit, grains, beans, nuts, and seeds.  If you have high blood pressure, you may need to limit your sodium intake or follow a diet such as the DASH (Dietary Approaches to Stop Hypertension) eating  plan. The DASH diet aims to lower high blood pressure.  Lifestyle  Set weight loss goals with help from your health care team. It is recommended that most people with prediabetes lose 7% of their body weight.  Exercise for at least 30 minutes 5 or more days a week.  Attend a support group or seek support from a mental health counselor.  Take over-the-counter and prescription medicines only as told by your health care provider.  What foods are recommended?  Fruits  Berries. Bananas. Apples. Oranges. Grapes. Papaya. [place]. Pomegranate. Kiwi. Grapefruit. Cherries.  Vegetables  Lettuce. Spinach. Peas. Beets. Cauliflower. Cabbage. Broccoli. Carrots. Tomatoes. Squash. Eggplant. Herbs. Peppers. Onions. Cucumbers. [place] sprouts.  Grains  Whole grains, such as whole-wheat or whole-grain breads, crackers, cereals, and pasta. Unsweetened oatmeal. Bulgur. Barley. Quinoa. Brown rice. Corn or whole-wheat flour tortillas or taco shells.  Meats and other proteins  Seafood. Poultry without skin. Lean cuts of pork and beef. Tofu. Eggs. Nuts. Beans.  Dairy  Low-fat or fat-free dairy products, such as yogurt, cottage cheese, and cheese.  Beverages  Water. Tea. Coffee. Sugar-free or diet soda. [place] water. Low-fat or nonfat milk. Milk alternatives, such as soy or almond milk.  Fats and oils  Olive oil. Canola oil. Sunflower oil. Grapeseed oil. Avocado. Walnuts.  Sweets and desserts  Sugar-free or low-fat pudding. Sugar-free or low-fat ice cream and other frozen treats.  Seasonings and condiments  Herbs. Sodium-free spices. Mustard. Relish. Low-salt, low-sugar ketchup. Low-salt, low-sugar barbecue sauce. Low-fat or fat-free mayonnaise.  The items listed above may not be a complete list of recommended foods and beverages. Contact a dietitian for more information.  What foods are not recommended?  Fruits  Fruits canned with syrup.  Vegetables  Canned vegetables. Frozen vegetables with butter or cream sauce.  Grains  Refined white  flour and flour products, such as bread, pasta, snack foods, and cereals.  Meats and other proteins  Fatty cuts of meat. Poultry with skin. Breaded or fried meat. Processed meats.  Dairy  Full-fat yogurt, cheese, or milk.  Beverages  Sweetened drinks, such as iced tea and soda.  Fats and oils  Butter. Lard. Ghee.  Sweets and desserts  Baked goods, such as cake, cupcakes, pastries, cookies, and cheesecake.  Seasonings and condiments  Spice mixes with added salt. Ketchup. Barbecue sauce. Mayonnaise.  The items listed above may not be a complete list of foods and beverages that are not recommended. Contact a dietitian for more information.  Where to find more information  American Diabetes Association: www.diabetes.org  Summary  You may need to make diet and lifestyle changes to help prevent the onset of diabetes. These changes can help you control blood sugar, improve cholesterol levels, and manage blood pressure.  Set weight loss goals with help from your health care team. It is recommended that most people with prediabetes lose 7% of their body weight.  Consider following a Mediterranean diet. This includes eating plenty of fresh fruits and vegetables, whole grains, beans, nuts, seeds, fish, and low-fat dairy, and using olive oil instead of other fats.  This information is not intended to replace advice given to you by your health care provider. Make sure you discuss any questions you have with your health care provider.  Document Revised: 03/18/2021 Document Reviewed: 03/18/2021  Elsevier Patient Education © 2022 Elsevier Inc.  Cholesterol Content in Foods  Cholesterol is a waxy, fat-like substance that helps to carry fat in the blood. The body needs cholesterol in small amounts, but too much cholesterol can cause damage to the arteries and heart.  What foods have cholesterol?  Cholesterol is found in animal-based foods, such as meat, seafood, and dairy. Generally, low-fat dairy and lean meats have less cholesterol  than full-fat dairy and fatty meats. The milligrams of cholesterol per serving (mg per serving) of common cholesterol-containing foods are listed below.  Meats and other proteins  Egg -- one large whole egg has 186 mg.  Veal shank -- 4 oz (113 g) has 141 mg.  Lean ground turkey (93% lean) -- 4 oz (113 g) has 118 mg.  Fat-trimmed lamb loin -- 4 oz (113 g) has 106 mg.  Lean ground beef (90% lean) -- 4 oz (113 g) has 100 mg.  Lobster -- 3.5 oz (99 g) has 90 mg.  Pork loin chops -- 4 oz (113 g) has 86 mg.  Canned salmon -- 3.5 oz (99 g) has 83 mg.  Fat-trimmed beef top loin -- 4 oz (113 g) has 78 mg.  Frankfurter -- 1 yesenia (3.5 oz or 99 g) has 77 mg.  Crab -- 3.5 oz (99 g) has 71 mg.  Roasted chicken without skin, white meat -- 4 oz (113 g) has 66 mg.  Light bologna -- 2 oz (57 g) has 45 mg.  Deli-cut turkey -- 2 oz (57 g) has 31 mg.  Canned tuna -- 3.5 oz (99 g) has 31 mg.  [place] -- 1 oz (28 g) has 29 mg.  Oysters and mussels (raw) -- 3.5 oz (99 g) has 25 mg.  Mackerel -- 1 oz (28 g) has 22 mg.  Trout -- 1 oz (28 g) has 20 mg.  Pork sausage -- 1 link (1 oz or 28 g) has 17 mg.  [place] -- 1 oz (28 g) has 16 mg.  Tilapia -- 1 oz (28 g) has 14 mg.  Dairy  Soft-serve ice cream -- ½ cup (4 oz or 86 g) has 103 mg.  Whole-milk yogurt -- 1 cup (8 oz or 245 g) has 29 mg.  Cheddar cheese -- 1 oz (28 g) has 28 mg.  American cheese -- 1 oz (28 g) has 28 mg.  Whole milk -- 1 cup (8 oz or 250 mL) has 23 mg.  2% milk -- 1 cup (8 oz or 250 mL) has 18 mg.  Cream cheese -- 1 tablespoon (Tbsp) (14.5 g) has 15 mg.  Cottage cheese -- ½ cup (4 oz or 113 g) has 14 mg.  Low-fat (1%) milk -- 1 cup (8 oz or 250 mL) has 10 mg.  Sour cream -- 1 Tbsp (12 g) has 8.5 mg.  Low-fat yogurt -- 1 cup (8 oz or 245 g) has 8 mg.  Nonfat Greek yogurt -- 1 cup (8 oz or 228 g) has 7 mg.  Half-and-half cream -- 1 Tbsp (15 mL) has 5 mg.  Fats and oils  Cod liver oil -- 1 tablespoon (Tbsp) (13.6 g) has 82 mg.  Butter -- 1 Tbsp (14 g) has 15 mg.  Lard -- 1 Tbsp  (12.8 g) has 14 mg.  [place] grease -- 1 Tbsp (12.9 g) has 14 mg.  Mayonnaise -- 1 Tbsp (13.8 g) has 5-10 mg.  Margarine -- 1 Tbsp (14 g) has 3-10 mg.  The items listed above may not be a complete list of foods with cholesterol. Exact amounts of cholesterol in these foods may vary depending on specific ingredients and brands. Contact a dietitian for more information.  What foods do not have cholesterol?  Most plant-based foods do not have cholesterol unless you combine them with a food that has cholesterol. Foods without cholesterol include:  Grains and cereals.  Vegetables.  Fruits.  Vegetable oils, such as olive, canola, and sunflower oil.  Legumes, such as peas, beans, and lentils.  Nuts and seeds.  Egg whites.  The items listed above may not be a complete list of foods that do not have cholesterol. Contact a dietitian for more information.  Summary  The body needs cholesterol in small amounts, but too much cholesterol can cause damage to the arteries and heart.  Cholesterol is found in animal-based foods, such as meat, seafood, and dairy. Generally, low-fat dairy and lean meats have less cholesterol than full-fat dairy and fatty meats.  This information is not intended to replace advice given to you by your health care provider. Make sure you discuss any questions you have with your health care provider.  Document Revised: 04/29/2022 Document Reviewed: 04/29/2022  Else[facility] Patient Education © 2022 Elsevier Inc.

## 2022-11-17 ENCOUNTER — OFFICE VISIT (OUTPATIENT)
Dept: INTERNAL MEDICINE | Facility: CLINIC | Age: 42
End: 2022-11-17

## 2022-11-17 VITALS
HEART RATE: 100 BPM | OXYGEN SATURATION: 98 % | BODY MASS INDEX: 26.05 KG/M2 | SYSTOLIC BLOOD PRESSURE: 124 MMHG | TEMPERATURE: 97.5 F | DIASTOLIC BLOOD PRESSURE: 82 MMHG | WEIGHT: 176.4 LBS

## 2022-11-17 DIAGNOSIS — F17.200 SMOKES TOBACCO DAILY: ICD-10-CM

## 2022-11-17 DIAGNOSIS — Z23 NEED FOR COVID-19 VACCINE: ICD-10-CM

## 2022-11-17 DIAGNOSIS — R55 VASOVAGAL SYNCOPE: Primary | ICD-10-CM

## 2022-11-17 PROCEDURE — 0124A COVID-19 (PFIZER) BIVALENT BOOSTER 12+YRS: CPT | Performed by: FAMILY MEDICINE

## 2022-11-17 PROCEDURE — 99214 OFFICE O/P EST MOD 30 MIN: CPT | Performed by: FAMILY MEDICINE

## 2022-11-17 PROCEDURE — 91312 COVID-19 (PFIZER) BIVALENT BOOSTER 12+YRS: CPT | Performed by: FAMILY MEDICINE

## 2022-11-17 RX ORDER — VARENICLINE TARTRATE 0.5 MG/1
TABLET, FILM COATED ORAL
Qty: 60 TABLET | Refills: 2 | Status: SHIPPED | OUTPATIENT
Start: 2022-11-17

## 2022-11-17 NOTE — PROGRESS NOTES
Date of Encounter: 2022  Patient: Mahin Lomas,  1980    Subjective   History of Presenting Illness  Chief complaint: Vasovagal syncope    Patient has had no further episodes of presumed vasovagal syncope since his last episode.  I reviewed the episode.  Appears he was Valsalvaing on the toilet in context of constipation when he became diaphoretic, dizzy, and had tunnel vision and passed out when he stood up.  His EKG and vitals were normal on EMS evaluation.    He is interested in quitting smoking.  His wife is quit and is using Chantix.  He would like to try this medication.     Amenable to COVID-19 bivalent vaccine.    Review of Systems:  Negative for fever, cough, shortness of breath, chest pain, syncope without prodrome, headache    The following portions of the patient's history were reviewed and updated as appropriate: allergies, current medications, past family history, past medical history, past social history, past surgical history and problem list.    Patient Active Problem List   Diagnosis   • Smokes tobacco daily   • Hyperlipidemia   • Eczema   • Repetitive strain injury of neck   • Prediabetes     Past Medical History:   Diagnosis Date   • Ear drum perforation     REPAIRED VIA SX   • Fatigue 10/7/2019   • Hearing problem    • Hemorrhoids 10/7/2019     Past Surgical History:   Procedure Laterality Date   • INNER EAR SURGERY Left      Family History   Problem Relation Age of Onset   • Heart disease Mother    • COPD Mother    • Heart disease Father    • Hypertension Father    • Diabetes Father    • Heart disease Maternal Grandmother    • Pancreatic cancer Maternal Grandfather    • Heart disease Paternal Grandfather    • Hypertension Paternal Grandfather    • Pancreatic cancer Cousin        Current Outpatient Medications:   •  multivitamin with minerals tablet tablet, Take 1 tablet by mouth Daily., Disp: , Rfl:   •  salicylic acid-lactic acid 17 % external solution, Apply  topically to the  appropriate area as directed Daily., Disp: 14 mL, Rfl: 1  •  triamcinolone (KENALOG) 0.1 % cream, Apply 1 application topically to the appropriate area as directed 2 (Two) Times a Day., Disp: 80 g, Rfl: 3  •  varenicline (Chantix) 0.5 MG tablet, Take 1 tab daily for 3 days, then increase to 1 tab twice daily, Disp: 60 tablet, Rfl: 2  No Known Allergies  Social History     Tobacco Use   • Smoking status: Every Day     Packs/day: 0.25     Types: Cigarettes     Start date: 1999   • Smokeless tobacco: Never   Substance Use Topics   • Alcohol use: No          Objective   Physical Exam  Vitals:    11/17/22 1047   BP: 124/82   BP Location: Left arm   Patient Position: Sitting   Cuff Size: Adult   Pulse: 100   Temp: 97.5 °F (36.4 °C)   TempSrc: Infrared   SpO2: 98%   Weight: 80 kg (176 lb 6.4 oz)     Body mass index is 26.05 kg/m².    Constitutional: NAD.  Cardiovascular: RRR. No murmurs. No LE edema b/l. Radial pulses 2+ bilaterally.  Pulmonary: CTA b/l. Good effort.  Psychiatric: Normal affect. Normal thought content.     Assessment & Plan   Assessment and Plan  Pleasant 42-year-old male with prediabetes, hyperlipidemia, daily tobacco use, who presents for the following:    Diagnoses and all orders for this visit:    1. Vasovagal syncope (Primary): Pretty classic presentation with prodrome, has not recurred, examination is unremarkable.  I discussed preventative measures in the future and reasons to return for further evaluation.    2. Smokes tobacco daily: Discussed risks and benefits of this medication  -     varenicline (Chantix) 0.5 MG tablet; Take 1 tab daily for 3 days, then increase to 1 tab twice daily  Dispense: 60 tablet; Refill: 2    3. Need for COVID-19 vaccine  -     COVID-19 Bivalent Booster (Pfizer) 12+yrs    Edward Oreilly MD  Family Medicine  O: [phone]    Disclaimer: Parts of this note were dictated by speech recognition. Minor errors in transcription may be present. Please call if questions.

## 2023-08-15 ENCOUNTER — PATIENT MESSAGE (OUTPATIENT)
Dept: INTERNAL MEDICINE | Facility: CLINIC | Age: 43
End: 2023-08-15

## 2023-08-15 PROBLEM — I83.90 ASYMPTOMATIC VARICOSE VEINS: Status: ACTIVE | Noted: 2023-08-15

## 2023-08-15 NOTE — TELEPHONE ENCOUNTER
From: Mahin Lomas  To: Edward Oreilly  Sent: 8/15/2023 2:14 PM EDT  Subject: Varicose veins    Should I be worried? Does not hurt.

## 2023-08-28 ENCOUNTER — OFFICE VISIT (OUTPATIENT)
Dept: INTERNAL MEDICINE | Facility: CLINIC | Age: 43
End: 2023-08-28
Payer: COMMERCIAL

## 2023-08-28 VITALS
WEIGHT: 176 LBS | SYSTOLIC BLOOD PRESSURE: 130 MMHG | DIASTOLIC BLOOD PRESSURE: 88 MMHG | HEIGHT: 69 IN | TEMPERATURE: 96.9 F | HEART RATE: 98 BPM | OXYGEN SATURATION: 100 % | BODY MASS INDEX: 26.07 KG/M2

## 2023-08-28 DIAGNOSIS — E78.5 HYPERLIPIDEMIA, UNSPECIFIED HYPERLIPIDEMIA TYPE: ICD-10-CM

## 2023-08-28 DIAGNOSIS — I83.90 ASYMPTOMATIC VARICOSE VEINS: ICD-10-CM

## 2023-08-28 DIAGNOSIS — F17.200 SMOKES TOBACCO DAILY: ICD-10-CM

## 2023-08-28 DIAGNOSIS — R73.03 PREDIABETES: ICD-10-CM

## 2023-08-28 DIAGNOSIS — Z00.00 ANNUAL PHYSICAL EXAM: Primary | ICD-10-CM

## 2023-08-28 DIAGNOSIS — L30.9 ECZEMA, UNSPECIFIED TYPE: ICD-10-CM
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
## 2023-08-28 PROBLEM — S16.1XXA: Status: RESOLVED | Noted: 2021-06-03 | Resolved: 2023-08-28

## 2023-08-28 LAB
EXPIRATION DATE: NORMAL
HBA1C MFR BLD: 5.6 % (ref 4.8–5.6)
Lab: NORMAL

## 2023-08-28 PROCEDURE — 83036 HEMOGLOBIN GLYCOSYLATED A1C: CPT | Performed by: FAMILY MEDICINE

## 2023-08-28 PROCEDURE — 99396 PREV VISIT EST AGE 40-64: CPT | Performed by: FAMILY MEDICINE

## 2023-08-28 NOTE — PROGRESS NOTES
Date of Encounter: 2023  Patient: Mahin Lomas,  1980    Subjective   History of Presenting Illness  Chief complaint: Annual physical     No acute concerns.    Prediabetes, got an A1c through [facility] 2023 which is improved from 5.9% to 5.8% since our last measurement.  No recent dietary or exercise changes.    Hyperlipidemia, 10-year ASCVD less than 5%, not interested in smoking cessation at this time.    Eczema controlled with topical therapy.    Pancreatic cancer in his maternal grandfather, paternal first cousin.  First cousin is in remission, there has not been any indication that this is any particular hereditary genetic mutation.    Asymptomatic varicose veins, does not wear compression stockings.     , 3 children 3,6 and 14. Smoking 1/2 pack per day since age 17 yo, not currently interested in cessation. Does not use alcohol.  Does not exercise but is fairly active at work . Works [employer]. Does not have a living will. Discussed COVID-19 booster.       Review of Systems:  Negative for fever, congestion, chest pain upon exertion, shortness of breath, vision changes, vomiting, dysuria, lymphadenopathy, muscle weakness, numbness, mood changes, rashes.    The following portions of the patient's history were reviewed and updated as appropriate: allergies, current medications, past family history, past medical history, past social history, past surgical history and problem list.    Patient Active Problem List   Diagnosis    Smokes tobacco daily    Hyperlipidemia    Eczema    Prediabetes    Asymptomatic varicose veins     Past Medical History:   Diagnosis Date    Ear drum perforation     REPAIRED VIA SX    Fatigue 10/07/2019    Hearing problem     Hemorrhoids 10/07/2019    Repetitive strain injury of neck 2021     Past Surgical History:   Procedure Laterality Date    INNER EAR SURGERY Left     VASECTOMY       Family History   Problem Relation Age of Onset     "Heart disease Mother     COPD Mother     Heart disease Father     Hypertension Father     Diabetes Father     Heart disease Maternal Grandmother     Pancreatic cancer Maternal Grandfather     Heart disease Paternal Grandfather     Hypertension Paternal Grandfather     Pancreatic cancer Cousin        Current Outpatient Medications:     multivitamin with minerals tablet tablet, Take 1 tablet by mouth Daily., Disp: , Rfl:     salicylic acid-lactic acid 17 % external solution, Apply  topically to the appropriate area as directed Daily., Disp: 14 mL, Rfl: 1    triamcinolone (KENALOG) 0.1 % cream, Apply 1 application topically to the appropriate area as directed 2 (Two) Times a Day., Disp: 80 g, Rfl: 3  No Known Allergies  Social History     Tobacco Use    Smoking status: Every Day     Packs/day: 0.50     Years: 15.00     Pack years: 7.50     Types: Cigarettes     Start date: 1/1/1999    Smokeless tobacco: Never   Substance Use Topics    Alcohol use: No    Drug use: Not Currently          Objective   Physical Exam  Vitals:    08/28/23 0957   BP: 130/88   BP Location: Left arm   Patient Position: Sitting   Cuff Size: Adult   Pulse: 98   Temp: 96.9 øF (36.1 øC)   TempSrc: Infrared   SpO2: 100%   Weight: 79.8 kg (176 lb)   Height: 175.3 cm (69\")     Body mass index is 25.99 kg/mý.    Constitutional: NAD.  Eyes: EOMI. PERRLA. Normal conjunctiva.  Ear, nose, mouth, throat: No tonsillar exudates or erythema.   Normal nasal mucosa. Normal external ear canals and TMs bilaterally.  Cardiovascular: RRR. No murmurs. No LE edema b/l. Radial pulses 2+ bilaterally.  Pulmonary: CTA b/l. Good effort.  Integumentary: No rashes or wounds on face or upper extremities.  Lymphatic: No anterior cervical lymphadenopathy.  Endocrine: No thyromegaly or palpable thyroid nodules.  Psychiatric: Normal affect. Normal thought content.  Gastrointestinal: Nondistended. No hepatosplenomegaly. No focal tenderness to palpation. Normal bowel sounds.   "   Assessment & Plan   Assessment and Plan  Pleasant 43-year-old male with prediabetes, hyperlipidemia, daily smoker, eczema, asymptomatic varicose veins, who presents for the following:    Diagnoses and all orders for this visit:    1. Annual physical exam (Primary): We discussed preventative care including age and patient-appropriate immunizations and cancer screening. We also discussed the importance of exercise and a healthy diet. This is their annual preventative exam.    2. Prediabetes: Point-of-care A1c today to monitor    3. Hyperlipidemia, unspecified hyperlipidemia type: Biggest risk factor is smoking, 10-year risk still on the lower end, will continue to follow and if not improving or not able to quit smoking we will consider coronary artery calcium scan in his 40s.    4. Eczema, unspecified type: Controlled with topical therapy    5. Smokes tobacco daily: Precontemplative, but no current symptoms    6. Asymptomatic varicose veins: Discussed compression stockings, reasons to consider further evaluation.    Return to office in 1 year for annual physical or earlier as needed.    Edward Oreilly MD  Family Medicine  O: [phone]    Disclaimer: Parts of this note were dictated by speech recognition. Minor errors in transcription may be present. Please call if questions.

## 2024-08-30 ENCOUNTER — OFFICE VISIT (OUTPATIENT)
Dept: INTERNAL MEDICINE | Facility: CLINIC | Age: 44
End: 2024-08-30
Payer: COMMERCIAL

## 2024-08-30 VITALS
HEIGHT: 69 IN | HEART RATE: 106 BPM | SYSTOLIC BLOOD PRESSURE: 128 MMHG | WEIGHT: 176 LBS | DIASTOLIC BLOOD PRESSURE: 80 MMHG | BODY MASS INDEX: 26.07 KG/M2 | OXYGEN SATURATION: 100 %

## 2024-08-30 DIAGNOSIS — Z87.898 HISTORY OF PREDIABETES: ICD-10-CM

## 2024-08-30 DIAGNOSIS — Z00.00 ANNUAL PHYSICAL EXAM: Primary | ICD-10-CM

## 2024-08-30 DIAGNOSIS — F17.200 SMOKES TOBACCO DAILY: ICD-10-CM

## 2024-08-30 DIAGNOSIS — E78.5 HYPERLIPIDEMIA, UNSPECIFIED HYPERLIPIDEMIA TYPE: ICD-10-CM

## 2024-08-30 DIAGNOSIS — Z13.89 SCREENING FOR BLOOD OR PROTEIN IN URINE: ICD-10-CM

## 2024-08-30 DIAGNOSIS — Z80.0 FAMILY HISTORY OF PANCREATIC CANCER: ICD-10-CM

## 2024-08-30 DIAGNOSIS — I83.90 ASYMPTOMATIC VARICOSE VEINS: ICD-10-CM

## 2024-08-30 PROCEDURE — 99396 PREV VISIT EST AGE 40-64: CPT | Performed by: FAMILY MEDICINE

## 2024-08-30 NOTE — PROGRESS NOTES
Date of Encounter: 2024  Patient: Mahin Lomas,  1980    Subjective   History of Presenting Illness  Chief complaint: Annual physical     No acute concerns.    Hyperlipidemia 10-year ASCVD less than 5%. History of prediabetes.     Eczema controlled with topical therapy.     Pancreatic cancer in his maternal grandfather in age 80s, paternal first cousin in age 70s.  First cousin is in remission, there has not been any indication that this is any particular hereditary genetic mutation.  Thinks there are also some additional family members with pancreatic cancer and he will get this information to me.     Asymptomatic varicose veins, wears tall tight socks during work     , 3 children 4,7 and 15. Smoking 1/2 pack per day since age 17 yo, he and his wife plan to quit on their own sometime over the next few months. Does not use alcohol.  Does not exercise but is fairly active at work. Works [employer]. Does not have a living will. Discussed influenza, COVID-19 vaccinations, new pneumonia vaccine if he continues to smoke    The following portions of the patient's history were reviewed and updated as appropriate: allergies, current medications, past family history, past medical history, past social history, past surgical history and problem list.    Patient Active Problem List   Diagnosis    Smokes tobacco daily    Hyperlipidemia    Eczema    Asymptomatic varicose veins    Family history of pancreatic cancer     Past Medical History:   Diagnosis Date    Ear drum perforation     REPAIRED VIA SX    Fatigue 10/07/2019    Hearing problem     Hemorrhoids 10/07/2019    Prediabetes 2022    Repetitive strain injury of neck 2021     Past Surgical History:   Procedure Laterality Date    INNER EAR SURGERY Left     VASECTOMY       Family History   Problem Relation Age of Onset    Heart disease Mother     COPD Mother     Heart disease Father     Hypertension Father     Diabetes Father      "Heart disease Maternal Grandmother     Pancreatic cancer Maternal Grandfather     Heart disease Paternal Grandfather     Hypertension Paternal Grandfather     Pancreatic cancer Cousin        Current Outpatient Medications:     multivitamin with minerals tablet tablet, Take 1 tablet by mouth Daily., Disp: , Rfl:     salicylic acid-lactic acid 17 % external solution, Apply  topically to the appropriate area as directed Daily., Disp: 14 mL, Rfl: 1    triamcinolone (KENALOG) 0.1 % cream, Apply 1 application topically to the appropriate area as directed 2 (Two) Times a Day., Disp: 80 g, Rfl: 3  No Known Allergies  Social History     Tobacco Use    Smoking status: Every Day     Current packs/day: 0.50     Average packs/day: 0.5 packs/day for 25.7 years (12.8 ttl pk-yrs)     Types: Cigarettes     Start date: 1/1/1999    Smokeless tobacco: Never   Vaping Use    Vaping status: Never Used   Substance Use Topics    Alcohol use: No    Drug use: Not Currently          Objective   Physical Exam  Vitals:    08/30/24 0911   BP: 128/80   Pulse: 106   SpO2: 100%   Weight: 79.8 kg (176 lb)   Height: 175.3 cm (69\")     Body mass index is 25.99 kg/m².    Constitutional: NAD.  Eyes: EOMI. PERRLA. Normal conjunctiva.  Ear, nose, mouth, throat: No tonsillar exudates or erythema.   Normal nasal mucosa. Normal external ear canals and TMs bilaterally.  Cardiovascular: RRR. No murmurs. No LE edema b/l. Radial pulses 2+ bilaterally.  Pulmonary: CTA b/l. Good effort.  Integumentary: No rashes or wounds on face or upper extremities.  Lymphatic: No anterior cervical lymphadenopathy.  Endocrine: No thyromegaly or palpable thyroid nodules.  Psychiatric: Normal affect. Normal thought content.  Gastrointestinal: Nondistended. No hepatosplenomegaly. No focal tenderness to palpation. Normal bowel sounds.     Assessment & Plan   Assessment and Plan  Pleasant 44-year-old male with hyperlipidemia, history of prediabetes, family history of pancreatic " cancer, daily light smoker, who presents for the following    Diagnoses and all orders for this visit:    1. Annual physical exam (Primary): We discussed preventative care including age and patient-appropriate immunizations and cancer screening. We also discussed the importance of exercise and a healthy diet. This is their annual preventative exam.    2. Hyperlipidemia, unspecified hyperlipidemia type: Normal 10-year ASCVD which will improve further with smoking cessation, continue to monitor  -     Comprehensive Metabolic Panel  -     CBC & Differential  -     Lipid Panel  -     TSH  -     T4, Free  -     T3, Free    3. History of prediabetes: Continue dietary measures, encouraged regular exercise  -     Hemoglobin A1c    4. Family history of pancreatic cancer: Request additional information on other family members with pancreatic cancer so we can formulate an appropriate screening plan, although there are no great standard guidelines at this time    5. Asymptomatic varicose veins: Continue compression as needed    6. Smokes tobacco daily: Smoking cessation, discussed medications and nicotine replacement if interested in the future    7. Screening for blood or protein in urine  -     Urinalysis With Microscopic - Urine, Clean Catch    BMI is >= 25 and <30. (Overweight) The following options were offered after discussion;: information on healthy weight added to patient's after visit summary     Edward Oreilly MD  Family Medicine  O: [phone]    Disclaimer: Parts of this note were dictated by speech recognition. Minor errors in transcription may be present. Please call if questions.

## 2024-08-31 LAB
ALBUMIN SERPL-MCNC: 4.6 G/DL (ref 4.1–5.1)
ALP SERPL-CCNC: 64 IU/L (ref 44–121)
ALT SERPL-CCNC: 17 IU/L (ref 0–44)
APPEARANCE UR: CLEAR
AST SERPL-CCNC: 21 IU/L (ref 0–40)
BACTERIA #/AREA URNS HPF: NORMAL /[HPF]
BASOPHILS # BLD AUTO: 0 X10E3/UL (ref 0–0.2)
BASOPHILS NFR BLD AUTO: 1 %
BILIRUB SERPL-MCNC: 0.5 MG/DL (ref 0–1.2)
BILIRUB UR QL STRIP: NEGATIVE
BUN SERPL-MCNC: 20 MG/DL (ref 6–24)
BUN/CREAT SERPL: 19 (ref 9–20)
CALCIUM SERPL-MCNC: 9.4 MG/DL (ref 8.7–10.2)
CASTS URNS QL MICRO: NORMAL /LPF
CHLORIDE SERPL-SCNC: 103 MMOL/L (ref 96–106)
CHOLEST SERPL-MCNC: 215 MG/DL (ref 100–199)
CO2 SERPL-SCNC: 23 MMOL/L (ref 20–29)
COLOR UR: YELLOW
CREAT SERPL-MCNC: 1.06 MG/DL (ref 0.76–1.27)
EGFRCR SERPLBLD CKD-EPI 2021: 89 ML/MIN/1.73
EOSINOPHIL # BLD AUTO: 0.1 X10E3/UL (ref 0–0.4)
EOSINOPHIL NFR BLD AUTO: 2 %
EPI CELLS #/AREA URNS HPF: NORMAL /HPF (ref 0–10)
ERYTHROCYTE [DISTWIDTH] IN BLOOD BY AUTOMATED COUNT: 13 % (ref 11.6–15.4)
GLOBULIN SER CALC-MCNC: 2.3 G/DL (ref 1.5–4.5)
GLUCOSE SERPL-MCNC: 106 MG/DL (ref 70–99)
GLUCOSE UR QL STRIP: NEGATIVE
HBA1C MFR BLD: 5.9 % (ref 4.8–5.6)
HCT VFR BLD AUTO: 44.8 % (ref 37.5–51)
HDLC SERPL-MCNC: 43 MG/DL
HGB BLD-MCNC: 14.7 G/DL (ref 13–17.7)
HGB UR QL STRIP: NEGATIVE
IMM GRANULOCYTES # BLD AUTO: 0 X10E3/UL (ref 0–0.1)
IMM GRANULOCYTES NFR BLD AUTO: 0 %
KETONES UR QL STRIP: NEGATIVE
LDLC SERPL CALC-MCNC: 156 MG/DL (ref 0–99)
LEUKOCYTE ESTERASE UR QL STRIP: NEGATIVE
LYMPHOCYTES # BLD AUTO: 1.3 X10E3/UL (ref 0.7–3.1)
LYMPHOCYTES NFR BLD AUTO: 28 %
MCH RBC QN AUTO: 28.7 PG (ref 26.6–33)
MCHC RBC AUTO-ENTMCNC: 32.8 G/DL (ref 31.5–35.7)
MCV RBC AUTO: 87 FL (ref 79–97)
MICRO URNS: NORMAL
MICRO URNS: NORMAL
MONOCYTES # BLD AUTO: 0.4 X10E3/UL (ref 0.1–0.9)
MONOCYTES NFR BLD AUTO: 9 %
NEUTROPHILS # BLD AUTO: 2.9 X10E3/UL (ref 1.4–7)
NEUTROPHILS NFR BLD AUTO: 60 %
NITRITE UR QL STRIP: NEGATIVE
PH UR STRIP: 5.5 [PH] (ref 5–7.5)
PLATELET # BLD AUTO: 264 X10E3/UL (ref 150–450)
POTASSIUM SERPL-SCNC: 4.3 MMOL/L (ref 3.5–5.2)
PROT SERPL-MCNC: 6.9 G/DL (ref 6–8.5)
PROT UR QL STRIP: NEGATIVE
RBC # BLD AUTO: 5.13 X10E6/UL (ref 4.14–5.8)
RBC #/AREA URNS HPF: NORMAL /HPF (ref 0–2)
SODIUM SERPL-SCNC: 139 MMOL/L (ref 134–144)
SP GR UR STRIP: 1.03 (ref 1–1.03)
T3FREE SERPL-MCNC: 3.6 PG/ML (ref 2–4.4)
T4 FREE SERPL-MCNC: 1.28 NG/DL (ref 0.82–1.77)
TRIGL SERPL-MCNC: 87 MG/DL (ref 0–149)
TSH SERPL DL<=0.005 MIU/L-ACNC: 0.84 UIU/ML (ref 0.45–4.5)
UROBILINOGEN UR STRIP-MCNC: 0.2 MG/DL (ref 0.2–1)
VLDLC SERPL CALC-MCNC: 16 MG/DL (ref 5–40)
WBC # BLD AUTO: 4.8 X10E3/UL (ref 3.4–10.8)
WBC #/AREA URNS HPF: NORMAL /HPF (ref 0–5)

## 2025-04-10 ENCOUNTER — OFFICE VISIT (OUTPATIENT)
Dept: FAMILY MEDICINE CLINIC | Facility: CLINIC | Age: 45
End: 2025-04-10
Payer: COMMERCIAL

## 2025-04-10 VITALS
BODY MASS INDEX: 26.51 KG/M2 | HEART RATE: 81 BPM | WEIGHT: 179 LBS | HEIGHT: 69 IN | SYSTOLIC BLOOD PRESSURE: 138 MMHG | TEMPERATURE: 98.1 F | DIASTOLIC BLOOD PRESSURE: 76 MMHG

## 2025-04-10 DIAGNOSIS — R73.03 PREDIABETES: ICD-10-CM

## 2025-04-10 DIAGNOSIS — R53.83 FATIGUE, UNSPECIFIED TYPE: Primary | ICD-10-CM

## 2025-04-10 PROCEDURE — 99213 OFFICE O/P EST LOW 20 MIN: CPT | Performed by: FAMILY MEDICINE

## 2025-04-10 NOTE — PROGRESS NOTES
"  Subjective   Mahin Lomas is a 44 y.o. male who is here for   Chief Complaint   Patient presents with    Establish Care   .     Fatigue  Symptoms are chronic.   Onset was at an unknown time.   Symptoms occur constantly.   Symptoms include fatigue.    Pertinent negative symptoms include no chest pain, no cough and no rash.   Aggravating factors include nothing.   Treatments tried include nothing.   Improvement on treatment was no relief.   Additional information:  Patient states he is sleeping on average around 4 to 5 hours per night.  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  Patient states he usually goes to bed about 1 AM and gets up around 5 to 6 AM.      History of Present Illness      Review of Systems   Constitutional:  Positive for fatigue. Negative for activity change and appetite change.   Respiratory:  Negative for cough and shortness of breath.    Cardiovascular:  Negative for chest pain and leg swelling.   Skin:  Negative for color change and rash.       Objective   Vitals:    04/10/25 1021   BP: 138/76   BP Location: Left arm   Patient Position: Sitting   Cuff Size: Large Adult   Pulse: 81   Temp: 98.1 °F (36.7 °C)   Weight: 81.2 kg (179 lb)   Height: 175.3 cm (69.02\")      Physical Exam  Vitals and nursing note reviewed.   Constitutional:       Appearance: Normal appearance. He is normal weight.   HENT:      Head: Normocephalic and atraumatic.   Cardiovascular:      Rate and Rhythm: Normal rate and regular rhythm.      Pulses: Normal pulses.      Heart sounds: No murmur heard.  Pulmonary:      Effort: Pulmonary effort is normal. No respiratory distress.      Breath sounds: Normal breath sounds. No wheezing.   Skin:     General: Skin is warm and dry.   Neurological:      General: No focal deficit present.      Mental Status: He is alert.   Psychiatric:         Mood and Affect: Mood normal.         " Thought Content: Thought content normal.       Physical Exam        Assessment & Plan   Assessment & Plan    Diagnoses and all orders for this visit:    1. Fatigue, unspecified type (Primary)  Unclear etiology.  Concerned that it may be related to poor sleep hygiene.  Will obtain baseline labs.  Will treat based on results.  -     TSH Rfx On Abnormal To Free T4  -     CBC & Differential  -     Lipid Panel  -     Comprehensive Metabolic Panel  -     Testosterone  -     Hemoglobin A1c  -     Vitamin B12  -     Folate    2. Prediabetes  Will repeat hemoglobin A1c.  -     TSH Rfx On Abnormal To Free T4  -     CBC & Differential  -     Lipid Panel  -     Comprehensive Metabolic Panel  -     Testosterone  -     Hemoglobin A1c  -     Vitamin B12  -     Folate      Results      There are no Patient Instructions on file for this visit.    There are no discontinued medications.     No follow-ups on file.             Brent Marte MD  [facility] [place], [place].

## 2025-04-11 LAB
ALBUMIN SERPL-MCNC: 4.9 G/DL (ref 3.5–5.2)
ALBUMIN/GLOB SERPL: 2 G/DL
ALP SERPL-CCNC: 68 U/L (ref 39–117)
ALT SERPL-CCNC: 26 U/L (ref 1–41)
AST SERPL-CCNC: 24 U/L (ref 1–40)
BASOPHILS # BLD AUTO: 0.09 10*3/MM3 (ref 0–0.2)
BASOPHILS NFR BLD AUTO: 1.2 % (ref 0–1.5)
BILIRUB SERPL-MCNC: 0.4 MG/DL (ref 0–1.2)
BUN SERPL-MCNC: 14 MG/DL (ref 6–20)
BUN/CREAT SERPL: 12.5 (ref 7–25)
CALCIUM SERPL-MCNC: 9.9 MG/DL (ref 8.6–10.5)
CHLORIDE SERPL-SCNC: 101 MMOL/L (ref 98–107)
CHOLEST SERPL-MCNC: 228 MG/DL (ref 0–200)
CO2 SERPL-SCNC: 25.8 MMOL/L (ref 22–29)
CREAT SERPL-MCNC: 1.12 MG/DL (ref 0.76–1.27)
EGFRCR SERPLBLD CKD-EPI 2021: 83.6 ML/MIN/1.73
EOSINOPHIL # BLD AUTO: 0.04 10*3/MM3 (ref 0–0.4)
EOSINOPHIL NFR BLD AUTO: 0.5 % (ref 0.3–6.2)
ERYTHROCYTE [DISTWIDTH] IN BLOOD BY AUTOMATED COUNT: 13 % (ref 12.3–15.4)
FOLATE SERPL-MCNC: 7.18 NG/ML (ref 4.78–24.2)
GLOBULIN SER CALC-MCNC: 2.4 GM/DL
GLUCOSE SERPL-MCNC: 95 MG/DL (ref 65–99)
HBA1C MFR BLD: 5.7 % (ref 4.8–5.6)
HCT VFR BLD AUTO: 46.7 % (ref 37.5–51)
HDLC SERPL-MCNC: 47 MG/DL (ref 40–60)
HGB BLD-MCNC: 15.4 G/DL (ref 13–17.7)
IMM GRANULOCYTES # BLD AUTO: 0.02 10*3/MM3 (ref 0–0.05)
IMM GRANULOCYTES NFR BLD AUTO: 0.3 % (ref 0–0.5)
LDLC SERPL CALC-MCNC: 161 MG/DL (ref 0–100)
LYMPHOCYTES # BLD AUTO: 1.35 10*3/MM3 (ref 0.7–3.1)
LYMPHOCYTES NFR BLD AUTO: 18.5 % (ref 19.6–45.3)
MCH RBC QN AUTO: 28.5 PG (ref 26.6–33)
MCHC RBC AUTO-ENTMCNC: 33 G/DL (ref 31.5–35.7)
MCV RBC AUTO: 86.5 FL (ref 79–97)
MONOCYTES # BLD AUTO: 0.47 10*3/MM3 (ref 0.1–0.9)
MONOCYTES NFR BLD AUTO: 6.4 % (ref 5–12)
NEUTROPHILS # BLD AUTO: 5.33 10*3/MM3 (ref 1.7–7)
NEUTROPHILS NFR BLD AUTO: 73.1 % (ref 42.7–76)
NRBC BLD AUTO-RTO: 0 /100 WBC (ref 0–0.2)
PLATELET # BLD AUTO: 288 10*3/MM3 (ref 140–450)
POTASSIUM SERPL-SCNC: 4.2 MMOL/L (ref 3.5–5.2)
PROT SERPL-MCNC: 7.3 G/DL (ref 6–8.5)
RBC # BLD AUTO: 5.4 10*6/MM3 (ref 4.14–5.8)
SODIUM SERPL-SCNC: 137 MMOL/L (ref 136–145)
TESTOST SERPL-MCNC: 563 NG/DL (ref 264–916)
TRIGL SERPL-MCNC: 110 MG/DL (ref 0–150)
TSH SERPL DL<=0.005 MIU/L-ACNC: 0.96 UIU/ML (ref 0.27–4.2)
VIT B12 SERPL-MCNC: 438 PG/ML (ref 211–946)
VLDLC SERPL CALC-MCNC: 20 MG/DL (ref 5–40)
WBC # BLD AUTO: 7.3 10*3/MM3 (ref 3.4–10.8)